# Patient Record
Sex: MALE | Race: OTHER | Employment: UNEMPLOYED | URBAN - METROPOLITAN AREA
[De-identification: names, ages, dates, MRNs, and addresses within clinical notes are randomized per-mention and may not be internally consistent; named-entity substitution may affect disease eponyms.]

---

## 2018-04-12 RX ORDER — LISINOPRIL 10 MG/1
10 TABLET ORAL DAILY
Qty: 90 TABLET | Refills: 0 | Status: SHIPPED | OUTPATIENT
Start: 2018-04-12 | End: 2018-07-17 | Stop reason: SDUPTHER

## 2018-05-16 ENCOUNTER — TELEPHONE (OUTPATIENT)
Dept: ADMINISTRATIVE | Age: 33
End: 2018-05-16

## 2018-05-29 ENCOUNTER — HOSPITAL ENCOUNTER (OUTPATIENT)
Age: 33
Discharge: HOME | End: 2018-05-31
Payer: MEDICAID

## 2018-05-29 ENCOUNTER — OFFICE VISIT (OUTPATIENT)
Dept: FAMILY MEDICINE CLINIC | Age: 33
End: 2018-05-29
Payer: MEDICAID

## 2018-05-29 VITALS
RESPIRATION RATE: 16 BRPM | WEIGHT: 203 LBS | TEMPERATURE: 98.5 F | DIASTOLIC BLOOD PRESSURE: 84 MMHG | BODY MASS INDEX: 27.5 KG/M2 | OXYGEN SATURATION: 97 % | HEIGHT: 72 IN | SYSTOLIC BLOOD PRESSURE: 126 MMHG | HEART RATE: 89 BPM

## 2018-05-29 DIAGNOSIS — Z00.00 PREVENTATIVE HEALTH CARE: Primary | ICD-10-CM

## 2018-05-29 DIAGNOSIS — R74.8 ELEVATED LIVER ENZYMES: ICD-10-CM

## 2018-05-29 DIAGNOSIS — Z00.00 PREVENTATIVE HEALTH CARE: ICD-10-CM

## 2018-05-29 LAB
ALBUMIN SERPL-MCNC: 4.5 G/DL (ref 3.5–5.2)
ALP BLD-CCNC: 67 U/L (ref 40–129)
ALT SERPL-CCNC: 37 U/L (ref 0–40)
ANION GAP SERPL CALCULATED.3IONS-SCNC: 19 MMOL/L (ref 7–16)
AST SERPL-CCNC: 35 U/L (ref 0–39)
BASOPHILS ABSOLUTE: 0.05 E9/L (ref 0–0.2)
BASOPHILS RELATIVE PERCENT: 1 % (ref 0–2)
BILIRUB SERPL-MCNC: 0.2 MG/DL (ref 0–1.2)
BUN BLDV-MCNC: 18 MG/DL (ref 6–20)
CALCIUM SERPL-MCNC: 9.2 MG/DL (ref 8.6–10.2)
CHLORIDE BLD-SCNC: 99 MMOL/L (ref 98–107)
CHOLESTEROL, TOTAL: 301 MG/DL (ref 0–199)
CO2: 20 MMOL/L (ref 22–29)
CREAT SERPL-MCNC: 0.9 MG/DL (ref 0.7–1.2)
EOSINOPHILS ABSOLUTE: 0.08 E9/L (ref 0.05–0.5)
EOSINOPHILS RELATIVE PERCENT: 1.5 % (ref 0–6)
GFR AFRICAN AMERICAN: >60
GFR NON-AFRICAN AMERICAN: >60 ML/MIN/1.73
GLUCOSE BLD-MCNC: 86 MG/DL (ref 74–109)
HCT VFR BLD CALC: 46.5 % (ref 37–54)
HDLC SERPL-MCNC: 56 MG/DL
HEMOGLOBIN: 15.6 G/DL (ref 12.5–16.5)
IMMATURE GRANULOCYTES #: 0.02 E9/L
IMMATURE GRANULOCYTES %: 0.4 % (ref 0–5)
LDL CHOLESTEROL CALCULATED: 187 MG/DL (ref 0–99)
LYMPHOCYTES ABSOLUTE: 1.16 E9/L (ref 1.5–4)
LYMPHOCYTES RELATIVE PERCENT: 22.4 % (ref 20–42)
MCH RBC QN AUTO: 33.2 PG (ref 26–35)
MCHC RBC AUTO-ENTMCNC: 33.5 % (ref 32–34.5)
MCV RBC AUTO: 98.9 FL (ref 80–99.9)
MONOCYTES ABSOLUTE: 0.48 E9/L (ref 0.1–0.95)
MONOCYTES RELATIVE PERCENT: 9.3 % (ref 2–12)
NEUTROPHILS ABSOLUTE: 3.38 E9/L (ref 1.8–7.3)
NEUTROPHILS RELATIVE PERCENT: 65.4 % (ref 43–80)
PDW BLD-RTO: 12.2 FL (ref 11.5–15)
PLATELET # BLD: 233 E9/L (ref 130–450)
PMV BLD AUTO: 11.7 FL (ref 7–12)
POTASSIUM SERPL-SCNC: 4.9 MMOL/L (ref 3.5–5)
RBC # BLD: 4.7 E12/L (ref 3.8–5.8)
SODIUM BLD-SCNC: 138 MMOL/L (ref 132–146)
TOTAL PROTEIN: 7.1 G/DL (ref 6.4–8.3)
TRIGL SERPL-MCNC: 291 MG/DL (ref 0–149)
VLDLC SERPL CALC-MCNC: 58 MG/DL
WBC # BLD: 5.2 E9/L (ref 4.5–11.5)

## 2018-05-29 PROCEDURE — 4004F PT TOBACCO SCREEN RCVD TLK: CPT | Performed by: FAMILY MEDICINE

## 2018-05-29 PROCEDURE — 80061 LIPID PANEL: CPT

## 2018-05-29 PROCEDURE — 85025 COMPLETE CBC W/AUTO DIFF WBC: CPT

## 2018-05-29 PROCEDURE — G8427 DOCREV CUR MEDS BY ELIG CLIN: HCPCS | Performed by: FAMILY MEDICINE

## 2018-05-29 PROCEDURE — 80053 COMPREHEN METABOLIC PANEL: CPT

## 2018-05-29 PROCEDURE — G8419 CALC BMI OUT NRM PARAM NOF/U: HCPCS | Performed by: FAMILY MEDICINE

## 2018-05-29 PROCEDURE — 99385 PREV VISIT NEW AGE 18-39: CPT | Performed by: FAMILY MEDICINE

## 2018-05-29 ASSESSMENT — PATIENT HEALTH QUESTIONNAIRE - PHQ9
2. FEELING DOWN, DEPRESSED OR HOPELESS: 0
SUM OF ALL RESPONSES TO PHQ QUESTIONS 1-9: 0
1. LITTLE INTEREST OR PLEASURE IN DOING THINGS: 0
SUM OF ALL RESPONSES TO PHQ9 QUESTIONS 1 & 2: 0

## 2018-06-17 ASSESSMENT — ENCOUNTER SYMPTOMS
WHEEZING: 0
CHEST TIGHTNESS: 0
COUGH: 0
VOMITING: 0
CONSTIPATION: 0
ABDOMINAL PAIN: 0
COLOR CHANGE: 0
EYE REDNESS: 0
SHORTNESS OF BREATH: 0
DIARRHEA: 0
BLOOD IN STOOL: 0

## 2018-06-22 ENCOUNTER — TELEPHONE (OUTPATIENT)
Dept: FAMILY MEDICINE CLINIC | Age: 33
End: 2018-06-22

## 2018-07-16 ENCOUNTER — TELEPHONE (OUTPATIENT)
Dept: FAMILY MEDICINE CLINIC | Age: 33
End: 2018-07-16

## 2018-07-16 RX ORDER — LISINOPRIL 10 MG/1
10 TABLET ORAL DAILY
Qty: 90 TABLET | Refills: 0 | Status: CANCELLED | OUTPATIENT
Start: 2018-07-16

## 2018-07-16 NOTE — TELEPHONE ENCOUNTER
Pt calling for a refill on Lisinopril. [facility] pharmacy in [facility].  Contact # [phone]

## 2018-07-18 RX ORDER — LISINOPRIL 10 MG/1
TABLET ORAL
Qty: 90 TABLET | Refills: 1 | Status: SHIPPED | OUTPATIENT
Start: 2018-07-18 | End: 2019-01-19 | Stop reason: SDUPTHER

## 2018-08-23 ENCOUNTER — TELEPHONE (OUTPATIENT)
Dept: ADMINISTRATIVE | Age: 33
End: 2018-08-23

## 2018-08-24 ENCOUNTER — HOSPITAL ENCOUNTER (OUTPATIENT)
Age: 33
Discharge: HOME | End: 2018-08-26
Payer: MEDICAID

## 2018-08-24 ENCOUNTER — OFFICE VISIT (OUTPATIENT)
Dept: FAMILY MEDICINE CLINIC | Age: 33
End: 2018-08-24
Payer: MEDICAID

## 2018-08-24 VITALS
SYSTOLIC BLOOD PRESSURE: 112 MMHG | TEMPERATURE: 97.9 F | WEIGHT: 192 LBS | HEART RATE: 79 BPM | BODY MASS INDEX: 26.88 KG/M2 | RESPIRATION RATE: 20 BRPM | HEIGHT: 71 IN | DIASTOLIC BLOOD PRESSURE: 76 MMHG | OXYGEN SATURATION: 98 %

## 2018-08-24 DIAGNOSIS — R55 NEAR SYNCOPE: ICD-10-CM

## 2018-08-24 DIAGNOSIS — Z13.220 SCREENING, LIPID: ICD-10-CM

## 2018-08-24 DIAGNOSIS — R55 NEAR SYNCOPE: Primary | ICD-10-CM

## 2018-08-24 LAB
ANION GAP SERPL CALCULATED.3IONS-SCNC: 14 MMOL/L (ref 7–16)
BUN BLDV-MCNC: 16 MG/DL (ref 6–20)
CALCIUM SERPL-MCNC: 9.2 MG/DL (ref 8.6–10.2)
CHLORIDE BLD-SCNC: 101 MMOL/L (ref 98–107)
CHOLESTEROL, TOTAL: 264 MG/DL (ref 0–199)
CO2: 23 MMOL/L (ref 22–29)
CREAT SERPL-MCNC: 0.9 MG/DL (ref 0.7–1.2)
GFR AFRICAN AMERICAN: >60
GFR NON-AFRICAN AMERICAN: >60 ML/MIN/1.73
GLUCOSE BLD-MCNC: 84 MG/DL (ref 74–109)
HDLC SERPL-MCNC: 74 MG/DL
LDL CHOLESTEROL CALCULATED: 150 MG/DL (ref 0–99)
POTASSIUM SERPL-SCNC: 5.1 MMOL/L (ref 3.5–5)
SODIUM BLD-SCNC: 138 MMOL/L (ref 132–146)
TRIGL SERPL-MCNC: 202 MG/DL (ref 0–149)
VLDLC SERPL CALC-MCNC: 40 MG/DL

## 2018-08-24 PROCEDURE — G8427 DOCREV CUR MEDS BY ELIG CLIN: HCPCS | Performed by: FAMILY MEDICINE

## 2018-08-24 PROCEDURE — 99213 OFFICE O/P EST LOW 20 MIN: CPT | Performed by: FAMILY MEDICINE

## 2018-08-24 PROCEDURE — 80061 LIPID PANEL: CPT

## 2018-08-24 PROCEDURE — G8419 CALC BMI OUT NRM PARAM NOF/U: HCPCS | Performed by: FAMILY MEDICINE

## 2018-08-24 PROCEDURE — 80048 BASIC METABOLIC PNL TOTAL CA: CPT

## 2018-08-24 PROCEDURE — 4004F PT TOBACCO SCREEN RCVD TLK: CPT | Performed by: FAMILY MEDICINE

## 2018-08-24 NOTE — PROGRESS NOTES
Subjective:     Patient: Kirill Mcgowan is a 28 y.o. male    HPI Not fully passed out. Hands and feet numb, some darkness. 14 hour days. Lifting 25-75 pounds every day. Hot weather. Previously landscarping. Drinks a lot of water, felt like heat exhaustion. 2 gallons per day. Does do acc walk at work. Taking 2 alleve per day    Review of Systems     No Known Allergies  Current Outpatient Prescriptions on File Prior to Visit   Medication Sig Dispense Refill    lisinopril (PRINIVIL;ZESTRIL) 10 MG tablet take 1 tablet by mouth once daily 90 tablet 1     No current facility-administered medications on file prior to visit. Past Medical History:   Diagnosis Date    Boxer's metacarpal fracture, neck, closed 2009      Patient Active Problem List   Diagnosis    Proximal phalanx fracture of finger    Laceration of finger of right hand    Elevated liver enzymes     Social History   Substance Use Topics    Smoking status: Current Every Day Smoker     Packs/day: 0.25     Types: Cigarettes    Smokeless tobacco: Never Used    Alcohol use Yes      Comment: vodka daily, frequent weekend binge drinking with 8 shots daily          Objective:     /76   Pulse 79   Temp 97.9 °F (36.6 °C) (Oral)   Resp 20   Ht 5' 11\" (1.803 m)   Wt 192 lb (87.1 kg)   SpO2 98%   BMI 26.78 kg/m²     Physical Exam   Constitutional: He is oriented to person, place, and time. He appears well-developed and well-nourished. Cardiovascular: Normal rate, regular rhythm, normal heart sounds and intact distal pulses. Exam reveals no gallop. No murmur heard. DP pulse 2/4 without edema   Pulmonary/Chest: Effort normal and breath sounds normal. No respiratory distress. He has no wheezes. Neurological: He is alert and oriented to person, place, and time. He has normal reflexes. He displays normal reflexes. No cranial nerve deficit. He exhibits normal muscle tone.  Coordination normal.   MS 5/5 Bilateral upper and lower

## 2018-10-01 ENCOUNTER — OFFICE VISIT (OUTPATIENT)
Dept: FAMILY MEDICINE CLINIC | Age: 33
End: 2018-10-01
Payer: MEDICAID

## 2018-10-01 VITALS
DIASTOLIC BLOOD PRESSURE: 82 MMHG | HEIGHT: 71 IN | HEART RATE: 87 BPM | SYSTOLIC BLOOD PRESSURE: 138 MMHG | OXYGEN SATURATION: 97 % | WEIGHT: 190 LBS | TEMPERATURE: 97.8 F | RESPIRATION RATE: 14 BRPM | BODY MASS INDEX: 26.6 KG/M2

## 2018-10-01 DIAGNOSIS — K52.9 ACUTE GASTROENTERITIS: Primary | ICD-10-CM

## 2018-10-01 PROCEDURE — G8484 FLU IMMUNIZE NO ADMIN: HCPCS | Performed by: FAMILY MEDICINE

## 2018-10-01 PROCEDURE — 99213 OFFICE O/P EST LOW 20 MIN: CPT | Performed by: FAMILY MEDICINE

## 2018-10-01 PROCEDURE — 4004F PT TOBACCO SCREEN RCVD TLK: CPT | Performed by: FAMILY MEDICINE

## 2018-10-01 PROCEDURE — G8419 CALC BMI OUT NRM PARAM NOF/U: HCPCS | Performed by: FAMILY MEDICINE

## 2018-10-01 PROCEDURE — G8427 DOCREV CUR MEDS BY ELIG CLIN: HCPCS | Performed by: FAMILY MEDICINE

## 2018-10-01 RX ORDER — LOPERAMIDE HYDROCHLORIDE 2 MG/1
2 CAPSULE ORAL 4 TIMES DAILY PRN
Qty: 15 CAPSULE | Refills: 0 | Status: SHIPPED
Start: 2018-10-01 | End: 2020-02-11

## 2018-10-18 ASSESSMENT — ENCOUNTER SYMPTOMS
DIARRHEA: 1
ABDOMINAL PAIN: 0
BLOOD IN STOOL: 0
WHEEZING: 0
CONSTIPATION: 0
COUGH: 0
COLOR CHANGE: 0
EYE REDNESS: 0
SHORTNESS OF BREATH: 0
VOMITING: 0
NAUSEA: 0
CHEST TIGHTNESS: 0

## 2018-11-13 ENCOUNTER — OFFICE VISIT (OUTPATIENT)
Dept: FAMILY MEDICINE CLINIC | Age: 33
End: 2018-11-13
Payer: MEDICAID

## 2018-11-13 VITALS
SYSTOLIC BLOOD PRESSURE: 126 MMHG | RESPIRATION RATE: 16 BRPM | WEIGHT: 178 LBS | BODY MASS INDEX: 23.59 KG/M2 | OXYGEN SATURATION: 98 % | HEIGHT: 73 IN | DIASTOLIC BLOOD PRESSURE: 82 MMHG | HEART RATE: 82 BPM | TEMPERATURE: 98.1 F

## 2018-11-13 DIAGNOSIS — R50.9 FEVER, UNSPECIFIED FEVER CAUSE: ICD-10-CM

## 2018-11-13 DIAGNOSIS — R52 BODY ACHES: Primary | ICD-10-CM

## 2018-11-13 DIAGNOSIS — R11.2 NON-INTRACTABLE VOMITING WITH NAUSEA, UNSPECIFIED VOMITING TYPE: ICD-10-CM

## 2018-11-13 LAB
INFLUENZA A ANTIGEN, POC: NORMAL
INFLUENZA B ANTIGEN, POC: NORMAL

## 2018-11-13 PROCEDURE — G8420 CALC BMI NORM PARAMETERS: HCPCS | Performed by: FAMILY MEDICINE

## 2018-11-13 PROCEDURE — 4004F PT TOBACCO SCREEN RCVD TLK: CPT | Performed by: FAMILY MEDICINE

## 2018-11-13 PROCEDURE — G8427 DOCREV CUR MEDS BY ELIG CLIN: HCPCS | Performed by: FAMILY MEDICINE

## 2018-11-13 PROCEDURE — G8484 FLU IMMUNIZE NO ADMIN: HCPCS | Performed by: FAMILY MEDICINE

## 2018-11-13 PROCEDURE — 87804 INFLUENZA ASSAY W/OPTIC: CPT | Performed by: FAMILY MEDICINE

## 2018-11-13 PROCEDURE — 99213 OFFICE O/P EST LOW 20 MIN: CPT | Performed by: FAMILY MEDICINE

## 2018-11-13 RX ORDER — ONDANSETRON HYDROCHLORIDE 8 MG/1
8 TABLET, FILM COATED ORAL EVERY 8 HOURS PRN
Qty: 9 TABLET | Refills: 0 | Status: SHIPPED
Start: 2018-11-13 | End: 2020-02-11

## 2018-11-13 NOTE — PROGRESS NOTES
mood. The patient is not nervous/anxious. All other systems reviewed and are negative. PHYSICAL EXAM  /82   Pulse 82   Temp 98.1 °F (36.7 °C)   Resp 16   Ht 6' 1\" (1.854 m)   Wt 178 lb (80.7 kg)   SpO2 98%   BMI 23.48 kg/m²   Physical Exam   Constitutional: He appears well-developed and well-nourished. No distress. HENT:   Head: Normocephalic and atraumatic. Right Ear: Tympanic membrane, external ear and ear canal normal.   Left Ear: Tympanic membrane, external ear and ear canal normal.   Nose: Mucosal edema and rhinorrhea present. Right sinus exhibits frontal sinus tenderness. Left sinus exhibits frontal sinus tenderness. Mouth/Throat: Uvula is midline and mucous membranes are normal. Posterior oropharyngeal erythema present. No oropharyngeal exudate or posterior oropharyngeal edema. Eyes: Pupils are equal, round, and reactive to light. Conjunctivae are normal. No scleral icterus. Neck: Neck supple. No thyromegaly present. Cardiovascular: Normal rate, regular rhythm and normal heart sounds. Pulmonary/Chest: Effort normal and breath sounds normal. He has no wheezes. He has no rales. Abdominal: Soft. Bowel sounds are normal.   Lymphadenopathy:     He has no cervical adenopathy. Skin: Skin is warm and dry. Flu neg    ASSESSMENT/PLAN:     Diagnosis Orders   1. Body aches  POCT Influenza A/B Antigen (BD Veritor)   2. Fever, unspecified fever cause  POCT Influenza A/B Antigen (BD Veritor)   3. Non-intractable vomiting with nausea, unspecified vomiting type  ondansetron (ZOFRAN) 8 MG tablet     Zofran. Stools improving. Will call tomorrow with update on symptoms in order to return to work. Reviewed age and gender appropriate health screening exams and vaccinations.   Advisedpatient regarding importance of keeping up with recommended health maintenance and to schedule as soon as possible if overdue, as this is important in assessing for undiagnosed

## 2018-11-26 ASSESSMENT — ENCOUNTER SYMPTOMS
ABDOMINAL PAIN: 0
EYE REDNESS: 0
BLOOD IN STOOL: 0
WHEEZING: 0
SINUS PAIN: 1
VOMITING: 1
CONSTIPATION: 0
NAUSEA: 1
CHEST TIGHTNESS: 0
SINUS PRESSURE: 1
SHORTNESS OF BREATH: 0
DIARRHEA: 1
COUGH: 0
COLOR CHANGE: 0

## 2019-01-10 ENCOUNTER — TELEPHONE (OUTPATIENT)
Dept: FAMILY MEDICINE CLINIC | Age: 34
End: 2019-01-10

## 2019-01-22 RX ORDER — LISINOPRIL 10 MG/1
TABLET ORAL
Qty: 90 TABLET | Refills: 1 | OUTPATIENT
Start: 2019-01-22

## 2019-01-22 RX ORDER — LISINOPRIL 10 MG/1
TABLET ORAL
Qty: 90 TABLET | Refills: 1 | Status: SHIPPED | OUTPATIENT
Start: 2019-01-22 | End: 2019-01-22 | Stop reason: SDUPTHER

## 2019-01-22 RX ORDER — LISINOPRIL 10 MG/1
TABLET ORAL
Qty: 90 TABLET | Refills: 1 | Status: SHIPPED | OUTPATIENT
Start: 2019-01-22 | End: 2019-09-17 | Stop reason: SDUPTHER

## 2019-01-23 ENCOUNTER — TELEPHONE (OUTPATIENT)
Dept: FAMILY MEDICINE CLINIC | Age: 34
End: 2019-01-23

## 2019-02-13 ENCOUNTER — OFFICE VISIT (OUTPATIENT)
Dept: FAMILY MEDICINE CLINIC | Age: 34
End: 2019-02-13
Payer: MEDICAID

## 2019-02-13 VITALS
SYSTOLIC BLOOD PRESSURE: 114 MMHG | RESPIRATION RATE: 14 BRPM | HEART RATE: 89 BPM | DIASTOLIC BLOOD PRESSURE: 74 MMHG | BODY MASS INDEX: 25.06 KG/M2 | WEIGHT: 179 LBS | TEMPERATURE: 98.4 F | OXYGEN SATURATION: 99 % | HEIGHT: 71 IN

## 2019-02-13 DIAGNOSIS — B34.9 VIRAL ILLNESS: Primary | ICD-10-CM

## 2019-02-13 DIAGNOSIS — G56.01 RIGHT CARPAL TUNNEL SYNDROME: ICD-10-CM

## 2019-02-13 PROCEDURE — G8484 FLU IMMUNIZE NO ADMIN: HCPCS | Performed by: FAMILY MEDICINE

## 2019-02-13 PROCEDURE — G8420 CALC BMI NORM PARAMETERS: HCPCS | Performed by: FAMILY MEDICINE

## 2019-02-13 PROCEDURE — 99213 OFFICE O/P EST LOW 20 MIN: CPT | Performed by: FAMILY MEDICINE

## 2019-02-13 PROCEDURE — G8427 DOCREV CUR MEDS BY ELIG CLIN: HCPCS | Performed by: FAMILY MEDICINE

## 2019-02-13 PROCEDURE — 4004F PT TOBACCO SCREEN RCVD TLK: CPT | Performed by: FAMILY MEDICINE

## 2019-02-13 ASSESSMENT — PATIENT HEALTH QUESTIONNAIRE - PHQ9
SUM OF ALL RESPONSES TO PHQ9 QUESTIONS 1 & 2: 0
SUM OF ALL RESPONSES TO PHQ QUESTIONS 1-9: 0
SUM OF ALL RESPONSES TO PHQ QUESTIONS 1-9: 0
2. FEELING DOWN, DEPRESSED OR HOPELESS: 0
1. LITTLE INTEREST OR PLEASURE IN DOING THINGS: 0

## 2019-03-03 ASSESSMENT — ENCOUNTER SYMPTOMS
WHEEZING: 0
CHEST TIGHTNESS: 0
SINUS PAIN: 0
VOMITING: 0
COUGH: 0
EYE DISCHARGE: 0
CONSTIPATION: 0
DIARRHEA: 0
EYE PAIN: 0
EYE ITCHING: 0
BLOOD IN STOOL: 0
RHINORRHEA: 0
ABDOMINAL PAIN: 0
EYE REDNESS: 0
COLOR CHANGE: 0
SORE THROAT: 0
SHORTNESS OF BREATH: 0
SINUS PRESSURE: 0
NAUSEA: 0

## 2019-03-26 ENCOUNTER — TELEPHONE (OUTPATIENT)
Dept: FAMILY MEDICINE CLINIC | Age: 34
End: 2019-03-26

## 2019-03-28 ENCOUNTER — OFFICE VISIT (OUTPATIENT)
Dept: FAMILY MEDICINE CLINIC | Age: 34
End: 2019-03-28
Payer: MEDICAID

## 2019-03-28 VITALS
BODY MASS INDEX: 24.83 KG/M2 | DIASTOLIC BLOOD PRESSURE: 68 MMHG | SYSTOLIC BLOOD PRESSURE: 110 MMHG | HEART RATE: 87 BPM | RESPIRATION RATE: 14 BRPM | WEIGHT: 178 LBS | OXYGEN SATURATION: 95 %

## 2019-03-28 DIAGNOSIS — H00.036 EYELID CELLULITIS, LEFT: ICD-10-CM

## 2019-03-28 DIAGNOSIS — H10.33 ACUTE CONJUNCTIVITIS OF BOTH EYES, UNSPECIFIED ACUTE CONJUNCTIVITIS TYPE: Primary | ICD-10-CM

## 2019-03-28 PROCEDURE — G8420 CALC BMI NORM PARAMETERS: HCPCS | Performed by: FAMILY MEDICINE

## 2019-03-28 PROCEDURE — 99213 OFFICE O/P EST LOW 20 MIN: CPT | Performed by: FAMILY MEDICINE

## 2019-03-28 PROCEDURE — G8484 FLU IMMUNIZE NO ADMIN: HCPCS | Performed by: FAMILY MEDICINE

## 2019-03-28 PROCEDURE — 4004F PT TOBACCO SCREEN RCVD TLK: CPT | Performed by: FAMILY MEDICINE

## 2019-03-28 PROCEDURE — G8427 DOCREV CUR MEDS BY ELIG CLIN: HCPCS | Performed by: FAMILY MEDICINE

## 2019-03-28 RX ORDER — CLINDAMYCIN HYDROCHLORIDE 300 MG/1
300 CAPSULE ORAL 3 TIMES DAILY
Qty: 21 CAPSULE | Refills: 0 | Status: SHIPPED | OUTPATIENT
Start: 2019-03-28 | End: 2019-04-04

## 2019-03-28 RX ORDER — POLYMYXIN B SULFATE AND TRIMETHOPRIM 1; 10000 MG/ML; [USP'U]/ML
1 SOLUTION OPHTHALMIC EVERY 4 HOURS
Qty: 20 ML | Refills: 0 | Status: SHIPPED | OUTPATIENT
Start: 2019-03-28 | End: 2019-04-04

## 2019-03-28 NOTE — PROGRESS NOTES
Psychiatric/Behavioral: Negative for confusion and dysphoric mood. The patient is not nervous/anxious. All other systems reviewed and are negative. PHYSICAL EXAM  /68   Pulse 87   Resp 14   Wt 178 lb (80.7 kg)   SpO2 95%   BMI 24.83 kg/m²   Physical Exam   Constitutional: He appears well-developed and well-nourished. No distress. HENT:   Head: Normocephalic and atraumatic. Right Ear: External ear normal.   Left Ear: External ear normal.   Nose: Nose normal.   Mouth/Throat: Oropharynx is clear and moist. No oropharyngeal exudate. Eyes: Pupils are equal, round, and reactive to light. Right eye exhibits no chemosis, no discharge, no exudate and no hordeolum. No foreign body present in the right eye. Left eye exhibits discharge. Left eye exhibits no chemosis, no exudate and no hordeolum. No foreign body present in the left eye. Right conjunctiva is injected. Right conjunctiva has no hemorrhage. Left conjunctiva is injected. Left conjunctiva has no hemorrhage. Right eye exhibits normal extraocular motion. Left eye exhibits normal extraocular motion. Cardiovascular: Normal rate and regular rhythm. Vitals reviewed. ASSESSMENT/PLAN:     Diagnosis Orders   1. Acute conjunctivitis of both eyes, unspecified acute conjunctivitis type  trimethoprim-polymyxin b (POLYTRIM) 25204-1.1 UNIT/ML-% ophthalmic solution   2. Eyelid cellulitis, left  clindamycin (CLEOCIN) 300 MG capsule       Reviewed age and gender appropriate health screening exams and vaccinations. Advisedpatient regarding importance of keeping up with recommended health maintenance and to schedule as soon as possible if overdue, as this is important in assessing for undiagnosed pathology, especially cancer. Patientverbalizes understanding and agrees. Call or go to ED immediately if symptoms worsen or persist.  No follow-ups on file. Sooner if necessary.       Counseled regarding above diagnosis, including possible risks and complications,especially if left uncontrolled. Counseled regarding the possible side effects, risks, benefits and alternatives to treatment; patient and/or guardian verbalizes understanding. Advised patient to call with any newmedication issues. All questions answered.     Rebecca Anaya MD  3/28/19

## 2019-04-15 ASSESSMENT — ENCOUNTER SYMPTOMS
BLOOD IN STOOL: 0
COUGH: 0
COLOR CHANGE: 0
SHORTNESS OF BREATH: 0
ABDOMINAL PAIN: 0
DIARRHEA: 0
VOMITING: 0
EYE PAIN: 0
EYE DISCHARGE: 0
EYE ITCHING: 0
CONSTIPATION: 0
WHEEZING: 0
EYE REDNESS: 1
PHOTOPHOBIA: 0
CHEST TIGHTNESS: 0

## 2019-09-20 RX ORDER — LISINOPRIL 10 MG/1
TABLET ORAL
Qty: 90 TABLET | Refills: 1 | Status: SHIPPED
Start: 2019-09-20 | End: 2020-03-20

## 2019-12-16 ENCOUNTER — OFFICE VISIT (OUTPATIENT)
Dept: PRIMARY CARE CLINIC | Age: 34
End: 2019-12-16
Payer: MEDICAID

## 2019-12-16 VITALS
BODY MASS INDEX: 26.11 KG/M2 | SYSTOLIC BLOOD PRESSURE: 137 MMHG | RESPIRATION RATE: 16 BRPM | HEIGHT: 73 IN | HEART RATE: 113 BPM | DIASTOLIC BLOOD PRESSURE: 84 MMHG | WEIGHT: 197 LBS | OXYGEN SATURATION: 96 %

## 2019-12-16 PROCEDURE — 90471 IMMUNIZATION ADMIN: CPT | Performed by: FAMILY MEDICINE

## 2019-12-16 PROCEDURE — G8482 FLU IMMUNIZE ORDER/ADMIN: HCPCS | Performed by: FAMILY MEDICINE

## 2019-12-16 PROCEDURE — 99395 PREV VISIT EST AGE 18-39: CPT | Performed by: FAMILY MEDICINE

## 2019-12-16 PROCEDURE — 90686 IIV4 VACC NO PRSV 0.5 ML IM: CPT | Performed by: FAMILY MEDICINE

## 2020-01-11 ASSESSMENT — ENCOUNTER SYMPTOMS
BLOOD IN STOOL: 0
COUGH: 0
ABDOMINAL PAIN: 0
DIARRHEA: 0
COLOR CHANGE: 0
CHEST TIGHTNESS: 0
EYE REDNESS: 0
CONSTIPATION: 0
WHEEZING: 0
SHORTNESS OF BREATH: 0
VOMITING: 0

## 2020-01-11 NOTE — PROGRESS NOTES
Nanda Romerochuck  : 1985    Chief Complaint:     Chief Complaint   Patient presents with    Hypertension    Flu Vaccine       HPI  Mood good. No excessive anxiety or stress. Eating healthy diet and trying to exercise, but not very consistent with it. BMI 26  Social History     Tobacco Use    Smoking status: Current Every Day Smoker     Packs/day: 0.25     Types: Cigarettes    Smokeless tobacco: Never Used   Substance Use Topics    Alcohol use: Yes     Comment: vodka daily, frequent weekend binge drinking with 8 shots daily    Drug use: No     Current on dental exam  No high risk sexual behavior  Wears seat belt. No guns in home. No environmental or occupational risks. Health Maintenance Due   Topic Date Due    Varicella Vaccine (1 of 2 - 2-dose childhood series) 02/15/1985    Pneumococcal 0-64 years Vaccine (1 of 1 - PPSV23) 02/15/1990    DTaP/Tdap/Td vaccine (1 - Tdap) 02/15/1995    HIV screen  02/15/1999    Potassium monitoring  2019    Creatinine monitoring  2019     Past medical, surgical, family and social histories reviewed and updated today as appropriate    Health Maintenance Due   Topic Date Due    Varicella Vaccine (1 of 2 - 2-dose childhood series) 02/15/1985    Pneumococcal 0-64 years Vaccine (1 of 1 - PPSV23) 02/15/1990    DTaP/Tdap/Td vaccine (1 - Tdap) 02/15/1995    HIV screen  02/15/1999    Potassium monitoring  2019    Creatinine monitoring  2019       Current Outpatient Medications   Medication Sig Dispense Refill    lisinopril (PRINIVIL;ZESTRIL) 10 MG tablet take 1 tablet by mouth once daily 90 tablet 1    Misc.  Devices (WRIST BRACE) MISC Soft neutral position right wrist brace  Size to fit  Dx:  Wrist pain/carpal tunnel syndrome 1 each 0    ondansetron (ZOFRAN) 8 MG tablet Take 1 tablet by mouth every 8 hours as needed for Nausea or Vomiting 9 tablet 0    loperamide (RA ANTI-DIARRHEAL) 2 MG capsule Take 1 capsule by mouth 4 times

## 2020-02-11 ENCOUNTER — OFFICE VISIT (OUTPATIENT)
Dept: PRIMARY CARE CLINIC | Age: 35
End: 2020-02-11
Payer: MEDICAID

## 2020-02-11 VITALS
OXYGEN SATURATION: 99 % | DIASTOLIC BLOOD PRESSURE: 121 MMHG | HEIGHT: 72 IN | SYSTOLIC BLOOD PRESSURE: 172 MMHG | BODY MASS INDEX: 26.55 KG/M2 | HEART RATE: 73 BPM | RESPIRATION RATE: 14 BRPM | WEIGHT: 196 LBS

## 2020-02-11 PROCEDURE — 99214 OFFICE O/P EST MOD 30 MIN: CPT | Performed by: NURSE PRACTITIONER

## 2020-02-11 PROCEDURE — 4004F PT TOBACCO SCREEN RCVD TLK: CPT | Performed by: NURSE PRACTITIONER

## 2020-02-11 PROCEDURE — G8419 CALC BMI OUT NRM PARAM NOF/U: HCPCS | Performed by: NURSE PRACTITIONER

## 2020-02-11 PROCEDURE — G8482 FLU IMMUNIZE ORDER/ADMIN: HCPCS | Performed by: NURSE PRACTITIONER

## 2020-02-11 PROCEDURE — G8427 DOCREV CUR MEDS BY ELIG CLIN: HCPCS | Performed by: NURSE PRACTITIONER

## 2020-02-11 RX ORDER — CLOTRIMAZOLE AND BETAMETHASONE DIPROPIONATE 10; .64 MG/G; MG/G
CREAM TOPICAL
Qty: 15 G | Refills: 0 | Status: SHIPPED
Start: 2020-02-11 | End: 2021-04-05

## 2020-02-11 ASSESSMENT — PATIENT HEALTH QUESTIONNAIRE - PHQ9
SUM OF ALL RESPONSES TO PHQ QUESTIONS 1-9: 0
SUM OF ALL RESPONSES TO PHQ9 QUESTIONS 1 & 2: 0
SUM OF ALL RESPONSES TO PHQ QUESTIONS 1-9: 0
2. FEELING DOWN, DEPRESSED OR HOPELESS: 0
1. LITTLE INTEREST OR PLEASURE IN DOING THINGS: 0

## 2020-02-11 NOTE — PROGRESS NOTES
Chief Complaint:   Wrist Pain (right wrist pain, lifting box at work and now it hurts worse then before) and Rash (right leg rash for a couple months, itchy)      History of Present Illness   Source of history provided by:  patient. History/Exam Limitations: none. Solon Meckel is a 27 y.o. old male who has a past medical history of:   Past Medical History:   Diagnosis Date    Boxer's metacarpal fracture, neck, closed 2009    Presents to the [facility] care complaining of right wrist pain pain for the past 3 days. Pt states there was a traumatic incident in which he hurt his wrist doing [employer] with his friends. Patient tried ice, aleve, and a compression cast with some relief. Patient states at work he lifts 40-80lbs and his pain was exacerbated at work. Pt states there is no bruising but minor swelling and some limited ROM to the area. Denies any paresthesias, weakness in the extremity, abrasions, active bleeding, recent illness, or any other complaints today. Presents with a right leg rash for the last few months. Patient tried using Aquaphor and a non-steroidal cream with no relief, patient states there is intermittent itching/pain to area. Patient denies fever/chills. ROS    Unless otherwise stated in this report or unable to obtain because of the patient's clinical or mental status as evidenced by the medical record, this patients's positive and negative responses for Review of Systems, constitutional, psych, eyes, ENT, cardiovascular, respiratory, gastrointestinal, neurological, genitourinary, musculoskeletal, integument systems and systems related to the presenting problem are either stated in the preceding or were not pertinent or were negative for the symptoms and/or complaints related to the medical problem. Past Medical History:  has a past medical history of Boxer's metacarpal fracture, neck, closed. Past Surgical History:  has no past surgical history on file.   Social History: reports that he has been smoking cigarettes. He has been smoking about 0.25 packs per day. He has never used smokeless tobacco. He reports current alcohol use. He reports that he does not use drugs. Family History: family history includes Coronary Art Dis in his maternal uncle. Allergies: Patient has no known allergies. Physical Exam         VS:  BP (!) 172/121   Pulse 73   Resp 14   Ht 6' (1.829 m)   Wt 196 lb (88.9 kg)   SpO2 99%   BMI 26.58 kg/m²    Oxygen Saturation Interpretation: Normal.    Constitutional:  A&Ox3, development consistent with age, NAD. CV: Heart RRR, no murmurs, rubs, or gallops. Lungs: CTAB without W/R/R. Wrist: Tenderness:  mild            Swelling: mild             Deformity: No obvious deformity noted. ROM: Decreased ROM due to pain. Skin:  No erythema, rashes, or abrasions noted. Neurovascular: Motor deficit: /UE strength 5/5 bilaterally. No increased pain with supination or pronation of the hand. Sensory deficit:   Sensation intact proximally and distally to the injury site. Pulse deficit: 2+ and bounding. Capillary refill: Less then 2 sec throughout. Hand: Right              Tenderness: thumb pip joint tenderness              Swelling: mild . Deformity: No obvious deformity. No malrotation noted. ROM: Decreased ROM due to pain. Skin:  No abrasions, erythema, or rashes noted. Neurological:  Alert and oriented. Motor functions intact. Skin: Patient has hyperpigmented erythematous scaly rash to right shin - 14 cm x 5 cm. Abrasions noted from scratching. Imaging Results   Imaging: All Radiology results interpreted by Radiologist unless otherwise noted. Assessment / Plan     Impression(s):    1.  Injury of right hand, initial encounter  THumb spica splint given to patient  Naprosyn 500 mg po bid  Ice  Will call with xray results  Return in 2 weeks if no improvement - may consider ortho referral  - XR HAND RIGHT (MIN 3 VIEWS); Future    2. Dermatitis    - clotrimazole-betamethasone (LOTRISONE) 1-0.05 % cream; Apply topically 2 times daily. Dispense: 15 g; Refill: 0      Scripts written for as above , side effects discussed. RICE protocol advised. Pt advised to f/u with orthopedics ASAP for further management and care. ER sooner if symptoms persist, worsen, or change. Red flag symptoms discussed. All questions answered. Return if symptoms worsen or fail to improve.     Jenifer White, [address]

## 2020-03-20 ENCOUNTER — TELEPHONE (OUTPATIENT)
Dept: FAMILY MEDICINE CLINIC | Age: 35
End: 2020-03-20

## 2020-03-20 RX ORDER — LISINOPRIL 10 MG/1
TABLET ORAL
Qty: 90 TABLET | Refills: 1 | Status: SHIPPED
Start: 2020-03-20 | End: 2020-08-11

## 2020-11-09 ENCOUNTER — TELEPHONE (OUTPATIENT)
Dept: PRIMARY CARE CLINIC | Age: 35
End: 2020-11-09

## 2020-11-09 NOTE — TELEPHONE ENCOUNTER
Pt called into office states that he needs refill on his LISINOPHRIL BP medication. Pt states that he has been out of it for 4 days. Asked if it can be prescribed ASAP. 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 Informed pt that message will be forwarded to clinical staff. Pt asked for return call when medications has been sent to pharmacy.        .Electronically signed by Ro Engle on 11/9/20 at 10:16 AM EST

## 2020-11-10 RX ORDER — LISINOPRIL 10 MG/1
TABLET ORAL
Qty: 90 TABLET | Refills: 1 | Status: SHIPPED
Start: 2020-11-10 | End: 2021-04-05

## 2020-12-23 ENCOUNTER — OFFICE VISIT (OUTPATIENT)
Dept: PRIMARY CARE CLINIC | Age: 35
End: 2020-12-23
Payer: MEDICAID

## 2020-12-23 VITALS
BODY MASS INDEX: 24.38 KG/M2 | DIASTOLIC BLOOD PRESSURE: 82 MMHG | SYSTOLIC BLOOD PRESSURE: 126 MMHG | WEIGHT: 180 LBS | HEIGHT: 72 IN | TEMPERATURE: 98.5 F | HEART RATE: 94 BPM | OXYGEN SATURATION: 98 %

## 2020-12-23 PROCEDURE — 99213 OFFICE O/P EST LOW 20 MIN: CPT | Performed by: NURSE PRACTITIONER

## 2020-12-23 PROCEDURE — G8484 FLU IMMUNIZE NO ADMIN: HCPCS | Performed by: NURSE PRACTITIONER

## 2020-12-23 PROCEDURE — G8427 DOCREV CUR MEDS BY ELIG CLIN: HCPCS | Performed by: NURSE PRACTITIONER

## 2020-12-23 PROCEDURE — 4004F PT TOBACCO SCREEN RCVD TLK: CPT | Performed by: NURSE PRACTITIONER

## 2020-12-23 PROCEDURE — G8420 CALC BMI NORM PARAMETERS: HCPCS | Performed by: NURSE PRACTITIONER

## 2020-12-23 NOTE — PROGRESS NOTES
Chief Complaint   Patient presents with    Rash     Started 2 months ago. Itchy       HPI:  Patient presents today for a rash to bilateral elbows x 2-3 months. Reports that the rash is dry, scaly and itchy. He has not tried any otc treatment for this. Denies that he has had this in the past. Denies any aggravating or alleviating factors. Denies fever, chills or any other signs of systemic infection. Prior to Visit Medications    Medication Sig Taking? Authorizing Provider   lisinopril (PRINIVIL;ZESTRIL) 10 MG tablet take 1 tablet by mouth once daily Yes Chidi Reveles MD   clotrimazole-betamethasone (LOTRISONE) 1-0.05 % cream Apply topically 2 times daily. Yes JEREMIAH Munoz - VANDANA   Misc. Devices (WRIST BRACE) MISC Soft neutral position right wrist brace  Size to fit  Dx:  Wrist pain/carpal tunnel syndrome Yes Chidi Reveles MD         No Known Allergies      Review of Systems  Review of Systems   Constitutional: Negative for chills and fever. HENT: Negative for congestion and nosebleeds. Respiratory: Negative for cough, shortness of breath and wheezing. Cardiovascular: Negative for chest pain, palpitations and leg swelling. Gastrointestinal: Negative for constipation, diarrhea, nausea and vomiting. Genitourinary: Negative for dysuria and urgency. Musculoskeletal: Negative for neck pain. Skin: Positive for rash. See HPI   Neurological: Negative for headaches. VS:  /82   Pulse 94   Temp 98.5 °F (36.9 °C)   Ht 6' (1.829 m)   Wt 180 lb (81.6 kg)   SpO2 98%   BMI 24.41 kg/m²     Patient's medical, social, and family history reviewed      Physical Exam  Physical Exam  Constitutional:       Appearance: He is well-developed. HENT:      Head: Normocephalic. Eyes:      Pupils: Pupils are equal, round, and reactive to light. Neck:      Musculoskeletal: Normal range of motion and neck supple. Thyroid: No thyromegaly.    Cardiovascular:      Rate and Rhythm: Normal rate and regular rhythm. Pulmonary:      Effort: Pulmonary effort is normal.      Breath sounds: Normal breath sounds. Abdominal:      General: Bowel sounds are normal.      Palpations: Abdomen is soft. Musculoskeletal: Normal range of motion. Lymphadenopathy:      Cervical: No cervical adenopathy. Skin:     Findings: Rash present. Rash is crusting (bilateral elbows) and scaling. Neurological:      Mental Status: He is alert and oriented to person, place, and time. Assessment/Plan:    1. Psoriasis    - betamethasone dipropionate (DIPROLENE) 0.05 % cream; Apply topically 2 times daily. Dispense: 45 g; Refill: 0    Follow up in 4-6 parish with PCP if no improvement. Patient instructed to use cream for 7 days on and 7 days off. Patient verbalized understanding,     Return if symptoms worsen or fail to improve.         JEREMIAH Sweet - CNP

## 2020-12-24 RX ORDER — BETAMETHASONE DIPROPIONATE 0.5 MG/G
CREAM TOPICAL
Qty: 45 G | Refills: 0 | Status: SHIPPED
Start: 2020-12-24 | End: 2021-04-05

## 2020-12-26 ASSESSMENT — ENCOUNTER SYMPTOMS
WHEEZING: 0
DIARRHEA: 0
CONSTIPATION: 0
NAUSEA: 0
VOMITING: 0
SHORTNESS OF BREATH: 0
COUGH: 0
ROS SKIN COMMENTS: SEE HPI

## 2020-12-28 RX ORDER — CLOBETASOL PROPIONATE 0.5 MG/G
CREAM TOPICAL
Qty: 1 TUBE | Refills: 0 | Status: SHIPPED
Start: 2020-12-28 | End: 2021-04-05

## 2021-03-31 ENCOUNTER — IMMUNIZATION (OUTPATIENT)
Dept: PRIMARY CARE CLINIC | Age: 36
End: 2021-03-31
Payer: MEDICAID

## 2021-03-31 PROCEDURE — 91300 COVID-19, PFIZER VACCINE 30MCG/0.3ML DOSE: CPT | Performed by: NURSE PRACTITIONER

## 2021-03-31 PROCEDURE — 0002A COVID-19, PFIZER VACCINE 30MCG/0.3ML DOSE: CPT | Performed by: NURSE PRACTITIONER

## 2021-04-04 ENCOUNTER — HOSPITAL ENCOUNTER (INPATIENT)
Age: 36
LOS: 1 days | Discharge: LEFT BEFORE DISCHARGE | DRG: 770 | End: 2021-04-06
Attending: EMERGENCY MEDICINE | Admitting: EMERGENCY MEDICINE
Payer: MEDICAID

## 2021-04-04 ENCOUNTER — APPOINTMENT (OUTPATIENT)
Dept: CT IMAGING | Age: 36
DRG: 770 | End: 2021-04-04
Payer: MEDICAID

## 2021-04-04 ENCOUNTER — APPOINTMENT (OUTPATIENT)
Dept: GENERAL RADIOLOGY | Age: 36
DRG: 770 | End: 2021-04-04
Payer: MEDICAID

## 2021-04-04 DIAGNOSIS — F10.939 ALCOHOL WITHDRAWAL SYNDROME WITH COMPLICATION (HCC): ICD-10-CM

## 2021-04-04 DIAGNOSIS — R56.9 NEW ONSET SEIZURE (HCC): Primary | ICD-10-CM

## 2021-04-04 LAB
ACETAMINOPHEN LEVEL: <5 MCG/ML (ref 10–30)
ALBUMIN SERPL-MCNC: 4.5 G/DL (ref 3.5–5.2)
ALP BLD-CCNC: 93 U/L (ref 40–129)
ALT SERPL-CCNC: 123 U/L (ref 0–40)
AMPHETAMINE SCREEN, URINE: NOT DETECTED
ANION GAP SERPL CALCULATED.3IONS-SCNC: 16 MMOL/L (ref 7–16)
ANISOCYTOSIS: ABNORMAL
AST SERPL-CCNC: 193 U/L (ref 0–39)
ATYPICAL LYMPHOCYTE RELATIVE PERCENT: 1.7 % (ref 0–4)
BARBITURATE SCREEN URINE: NOT DETECTED
BASOPHILS ABSOLUTE: 0 E9/L (ref 0–0.2)
BASOPHILS RELATIVE PERCENT: 1.5 % (ref 0–2)
BENZODIAZEPINE SCREEN, URINE: NOT DETECTED
BILIRUB SERPL-MCNC: 1.4 MG/DL (ref 0–1.2)
BUN BLDV-MCNC: 9 MG/DL (ref 6–20)
CALCIUM SERPL-MCNC: 9.5 MG/DL (ref 8.6–10.2)
CANNABINOID SCREEN URINE: NOT DETECTED
CHLORIDE BLD-SCNC: 95 MMOL/L (ref 98–107)
CO2: 23 MMOL/L (ref 22–29)
COCAINE METABOLITE SCREEN URINE: NOT DETECTED
CREAT SERPL-MCNC: 0.8 MG/DL (ref 0.7–1.2)
EOSINOPHILS ABSOLUTE: 0.07 E9/L (ref 0.05–0.5)
EOSINOPHILS RELATIVE PERCENT: 1.7 % (ref 0–6)
ETHANOL: <10 MG/DL (ref 0–0.08)
FENTANYL SCREEN, URINE: NOT DETECTED
GFR AFRICAN AMERICAN: >60
GFR NON-AFRICAN AMERICAN: >60 ML/MIN/1.73
GLUCOSE BLD-MCNC: 179 MG/DL (ref 74–99)
HCT VFR BLD CALC: 43.3 % (ref 37–54)
HEMOGLOBIN: 14.6 G/DL (ref 12.5–16.5)
LACTIC ACID: 3.2 MMOL/L (ref 0.5–2.2)
LYMPHOCYTES ABSOLUTE: 0.4 E9/L (ref 1.5–4)
LYMPHOCYTES RELATIVE PERCENT: 7.8 % (ref 20–42)
Lab: NORMAL
MAGNESIUM: 1.9 MG/DL (ref 1.6–2.6)
MCH RBC QN AUTO: 34.3 PG (ref 26–35)
MCHC RBC AUTO-ENTMCNC: 33.7 % (ref 32–34.5)
MCV RBC AUTO: 101.6 FL (ref 80–99.9)
METAMYELOCYTES RELATIVE PERCENT: 1.7 % (ref 0–1)
METHADONE SCREEN, URINE: NOT DETECTED
MONOCYTES ABSOLUTE: 0.2 E9/L (ref 0.1–0.95)
MONOCYTES RELATIVE PERCENT: 5.2 % (ref 2–12)
NEUTROPHILS ABSOLUTE: 3.32 E9/L (ref 1.8–7.3)
NEUTROPHILS RELATIVE PERCENT: 81.7 % (ref 43–80)
OPIATE SCREEN URINE: NOT DETECTED
OXYCODONE URINE: NOT DETECTED
PDW BLD-RTO: 12.7 FL (ref 11.5–15)
PHENCYCLIDINE SCREEN URINE: NOT DETECTED
PLATELET # BLD: 158 E9/L (ref 130–450)
PMV BLD AUTO: 10.8 FL (ref 7–12)
POLYCHROMASIA: ABNORMAL
POTASSIUM SERPL-SCNC: 4.2 MMOL/L (ref 3.5–5)
RBC # BLD: 4.26 E12/L (ref 3.8–5.8)
SALICYLATE, SERUM: <0.3 MG/DL (ref 0–30)
SODIUM BLD-SCNC: 134 MMOL/L (ref 132–146)
TOTAL PROTEIN: 7.2 G/DL (ref 6.4–8.3)
TRICYCLIC ANTIDEPRESSANTS SCREEN SERUM: NEGATIVE NG/ML
TROPONIN: <0.01 NG/ML (ref 0–0.03)
WBC # BLD: 4 E9/L (ref 4.5–11.5)

## 2021-04-04 PROCEDURE — 82077 ASSAY SPEC XCP UR&BREATH IA: CPT

## 2021-04-04 PROCEDURE — 70450 CT HEAD/BRAIN W/O DYE: CPT

## 2021-04-04 PROCEDURE — 2580000003 HC RX 258: Performed by: NURSE PRACTITIONER

## 2021-04-04 PROCEDURE — 6360000002 HC RX W HCPCS: Performed by: NURSE PRACTITIONER

## 2021-04-04 PROCEDURE — 83605 ASSAY OF LACTIC ACID: CPT

## 2021-04-04 PROCEDURE — 80143 DRUG ASSAY ACETAMINOPHEN: CPT

## 2021-04-04 PROCEDURE — 99284 EMERGENCY DEPT VISIT MOD MDM: CPT

## 2021-04-04 PROCEDURE — 80053 COMPREHEN METABOLIC PANEL: CPT

## 2021-04-04 PROCEDURE — 93005 ELECTROCARDIOGRAM TRACING: CPT | Performed by: NURSE PRACTITIONER

## 2021-04-04 PROCEDURE — 80179 DRUG ASSAY SALICYLATE: CPT

## 2021-04-04 PROCEDURE — 83735 ASSAY OF MAGNESIUM: CPT

## 2021-04-04 PROCEDURE — 71045 X-RAY EXAM CHEST 1 VIEW: CPT

## 2021-04-04 PROCEDURE — 87635 SARS-COV-2 COVID-19 AMP PRB: CPT

## 2021-04-04 PROCEDURE — 72125 CT NECK SPINE W/O DYE: CPT

## 2021-04-04 PROCEDURE — 84484 ASSAY OF TROPONIN QUANT: CPT

## 2021-04-04 PROCEDURE — 72110 X-RAY EXAM L-2 SPINE 4/>VWS: CPT

## 2021-04-04 PROCEDURE — 80307 DRUG TEST PRSMV CHEM ANLYZR: CPT

## 2021-04-04 PROCEDURE — 85025 COMPLETE CBC W/AUTO DIFF WBC: CPT

## 2021-04-04 PROCEDURE — 72074 X-RAY EXAM THORAC SPINE4/>VW: CPT

## 2021-04-04 RX ORDER — LORAZEPAM 1 MG/1
1 TABLET ORAL
Status: DISCONTINUED | OUTPATIENT
Start: 2021-04-04 | End: 2021-04-06 | Stop reason: HOSPADM

## 2021-04-04 RX ORDER — LORAZEPAM 2 MG/ML
1 INJECTION INTRAMUSCULAR
Status: DISCONTINUED | OUTPATIENT
Start: 2021-04-04 | End: 2021-04-06 | Stop reason: HOSPADM

## 2021-04-04 RX ORDER — LORAZEPAM 1 MG/1
2 TABLET ORAL
Status: DISCONTINUED | OUTPATIENT
Start: 2021-04-04 | End: 2021-04-06 | Stop reason: HOSPADM

## 2021-04-04 RX ORDER — LEVETIRACETAM 5 MG/ML
500 INJECTION INTRAVASCULAR ONCE
Status: COMPLETED | OUTPATIENT
Start: 2021-04-04 | End: 2021-04-04

## 2021-04-04 RX ORDER — SODIUM CHLORIDE 0.9 % (FLUSH) 0.9 %
10 SYRINGE (ML) INJECTION PRN
Status: DISCONTINUED | OUTPATIENT
Start: 2021-04-04 | End: 2021-04-05 | Stop reason: SDUPTHER

## 2021-04-04 RX ORDER — THIAMINE HYDROCHLORIDE 100 MG/ML
100 INJECTION, SOLUTION INTRAMUSCULAR; INTRAVENOUS DAILY
Status: DISCONTINUED | OUTPATIENT
Start: 2021-04-05 | End: 2021-04-06 | Stop reason: HOSPADM

## 2021-04-04 RX ORDER — SODIUM CHLORIDE 0.9 % (FLUSH) 0.9 %
10 SYRINGE (ML) INJECTION EVERY 12 HOURS SCHEDULED
Status: DISCONTINUED | OUTPATIENT
Start: 2021-04-04 | End: 2021-04-05 | Stop reason: SDUPTHER

## 2021-04-04 RX ORDER — LORAZEPAM 2 MG/ML
3 INJECTION INTRAMUSCULAR
Status: DISCONTINUED | OUTPATIENT
Start: 2021-04-04 | End: 2021-04-06 | Stop reason: HOSPADM

## 2021-04-04 RX ORDER — SODIUM CHLORIDE 9 MG/ML
25 INJECTION, SOLUTION INTRAVENOUS PRN
Status: DISCONTINUED | OUTPATIENT
Start: 2021-04-04 | End: 2021-04-06 | Stop reason: HOSPADM

## 2021-04-04 RX ORDER — LORAZEPAM 2 MG/ML
2 INJECTION INTRAMUSCULAR
Status: DISCONTINUED | OUTPATIENT
Start: 2021-04-04 | End: 2021-04-06 | Stop reason: HOSPADM

## 2021-04-04 RX ORDER — LORAZEPAM 1 MG/1
4 TABLET ORAL
Status: DISCONTINUED | OUTPATIENT
Start: 2021-04-04 | End: 2021-04-06 | Stop reason: HOSPADM

## 2021-04-04 RX ORDER — LORAZEPAM 2 MG/ML
4 INJECTION INTRAMUSCULAR
Status: DISCONTINUED | OUTPATIENT
Start: 2021-04-04 | End: 2021-04-06 | Stop reason: HOSPADM

## 2021-04-04 RX ORDER — 0.9 % SODIUM CHLORIDE 0.9 %
1000 INTRAVENOUS SOLUTION INTRAVENOUS ONCE
Status: COMPLETED | OUTPATIENT
Start: 2021-04-04 | End: 2021-04-04

## 2021-04-04 RX ORDER — LORAZEPAM 1 MG/1
3 TABLET ORAL
Status: DISCONTINUED | OUTPATIENT
Start: 2021-04-04 | End: 2021-04-06 | Stop reason: HOSPADM

## 2021-04-04 RX ADMIN — SODIUM CHLORIDE 1000 ML: 9 INJECTION, SOLUTION INTRAVENOUS at 22:02

## 2021-04-04 RX ADMIN — LORAZEPAM 4 MG: 2 INJECTION INTRAMUSCULAR; INTRAVENOUS at 22:21

## 2021-04-04 RX ADMIN — LEVETIRACETAM 500 MG: 5 INJECTION INTRAVENOUS at 22:03

## 2021-04-05 PROBLEM — R56.9 NEW ONSET SEIZURE (HCC): Status: ACTIVE | Noted: 2021-04-05

## 2021-04-05 PROBLEM — R56.9 SEIZURE (HCC): Status: ACTIVE | Noted: 2021-04-05

## 2021-04-05 LAB
ALBUMIN SERPL-MCNC: 4.3 G/DL (ref 3.5–5.2)
ALP BLD-CCNC: 83 U/L (ref 40–129)
ALT SERPL-CCNC: 113 U/L (ref 0–40)
ANION GAP SERPL CALCULATED.3IONS-SCNC: 15 MMOL/L (ref 7–16)
AST SERPL-CCNC: 205 U/L (ref 0–39)
BILIRUB SERPL-MCNC: 1.1 MG/DL (ref 0–1.2)
BUN BLDV-MCNC: 7 MG/DL (ref 6–20)
CALCIUM SERPL-MCNC: 9.1 MG/DL (ref 8.6–10.2)
CHLORIDE BLD-SCNC: 100 MMOL/L (ref 98–107)
CO2: 21 MMOL/L (ref 22–29)
CREAT SERPL-MCNC: 0.8 MG/DL (ref 0.7–1.2)
EKG ATRIAL RATE: 97 BPM
EKG P AXIS: 71 DEGREES
EKG P-R INTERVAL: 130 MS
EKG Q-T INTERVAL: 350 MS
EKG QRS DURATION: 74 MS
EKG QTC CALCULATION (BAZETT): 444 MS
EKG R AXIS: 86 DEGREES
EKG T AXIS: 35 DEGREES
EKG VENTRICULAR RATE: 97 BPM
GFR AFRICAN AMERICAN: >60
GFR NON-AFRICAN AMERICAN: >60 ML/MIN/1.73
GLUCOSE BLD-MCNC: 174 MG/DL
GLUCOSE BLD-MCNC: 96 MG/DL (ref 74–99)
HCT VFR BLD CALC: 41.8 % (ref 37–54)
HEMOGLOBIN: 14.3 G/DL (ref 12.5–16.5)
LACTIC ACID: 1.4 MMOL/L (ref 0.5–2.2)
MCH RBC QN AUTO: 35.2 PG (ref 26–35)
MCHC RBC AUTO-ENTMCNC: 34.2 % (ref 32–34.5)
MCV RBC AUTO: 103 FL (ref 80–99.9)
PDW BLD-RTO: 12.7 FL (ref 11.5–15)
PLATELET # BLD: 151 E9/L (ref 130–450)
PMV BLD AUTO: 11.3 FL (ref 7–12)
POTASSIUM REFLEX MAGNESIUM: 3.9 MMOL/L (ref 3.5–5)
RBC # BLD: 4.06 E12/L (ref 3.8–5.8)
SARS-COV-2, NAAT: NOT DETECTED
SODIUM BLD-SCNC: 136 MMOL/L (ref 132–146)
TOTAL CK: 3495 U/L (ref 20–200)
TOTAL PROTEIN: 6.8 G/DL (ref 6.4–8.3)
WBC # BLD: 5.1 E9/L (ref 4.5–11.5)

## 2021-04-05 PROCEDURE — 2580000003 HC RX 258: Performed by: EMERGENCY MEDICINE

## 2021-04-05 PROCEDURE — 6360000002 HC RX W HCPCS: Performed by: INTERNAL MEDICINE

## 2021-04-05 PROCEDURE — 2060000000 HC ICU INTERMEDIATE R&B

## 2021-04-05 PROCEDURE — 6360000002 HC RX W HCPCS: Performed by: NURSE PRACTITIONER

## 2021-04-05 PROCEDURE — 6370000000 HC RX 637 (ALT 250 FOR IP): Performed by: EMERGENCY MEDICINE

## 2021-04-05 PROCEDURE — 99253 IP/OBS CNSLTJ NEW/EST LOW 45: CPT | Performed by: PSYCHIATRY & NEUROLOGY

## 2021-04-05 PROCEDURE — 85027 COMPLETE CBC AUTOMATED: CPT

## 2021-04-05 PROCEDURE — 80053 COMPREHEN METABOLIC PANEL: CPT

## 2021-04-05 PROCEDURE — 82550 ASSAY OF CK (CPK): CPT

## 2021-04-05 PROCEDURE — 6360000002 HC RX W HCPCS: Performed by: EMERGENCY MEDICINE

## 2021-04-05 PROCEDURE — 83605 ASSAY OF LACTIC ACID: CPT

## 2021-04-05 RX ORDER — ACETAMINOPHEN 325 MG/1
650 TABLET ORAL EVERY 6 HOURS PRN
Status: DISCONTINUED | OUTPATIENT
Start: 2021-04-05 | End: 2021-04-06 | Stop reason: HOSPADM

## 2021-04-05 RX ORDER — LISINOPRIL 10 MG/1
10 TABLET ORAL DAILY
COMMUNITY
End: 2021-06-18

## 2021-04-05 RX ORDER — SODIUM CHLORIDE 0.9 % (FLUSH) 0.9 %
10 SYRINGE (ML) INJECTION EVERY 12 HOURS SCHEDULED
Status: DISCONTINUED | OUTPATIENT
Start: 2021-04-05 | End: 2021-04-06 | Stop reason: HOSPADM

## 2021-04-05 RX ORDER — LISINOPRIL 10 MG/1
10 TABLET ORAL DAILY
Status: DISCONTINUED | OUTPATIENT
Start: 2021-04-05 | End: 2021-04-06 | Stop reason: HOSPADM

## 2021-04-05 RX ORDER — PROMETHAZINE HYDROCHLORIDE 25 MG/1
12.5 TABLET ORAL EVERY 6 HOURS PRN
Status: DISCONTINUED | OUTPATIENT
Start: 2021-04-05 | End: 2021-04-06 | Stop reason: HOSPADM

## 2021-04-05 RX ORDER — MULTIVITAMIN WITH IRON
1 TABLET ORAL DAILY
Status: DISCONTINUED | OUTPATIENT
Start: 2021-04-05 | End: 2021-04-06 | Stop reason: HOSPADM

## 2021-04-05 RX ORDER — LORAZEPAM 2 MG/ML
2 INJECTION INTRAMUSCULAR EVERY 4 HOURS PRN
Status: DISCONTINUED | OUTPATIENT
Start: 2021-04-05 | End: 2021-04-06 | Stop reason: HOSPADM

## 2021-04-05 RX ORDER — ACETAMINOPHEN 650 MG/1
650 SUPPOSITORY RECTAL EVERY 6 HOURS PRN
Status: DISCONTINUED | OUTPATIENT
Start: 2021-04-05 | End: 2021-04-06 | Stop reason: HOSPADM

## 2021-04-05 RX ORDER — POLYETHYLENE GLYCOL 3350 17 G/17G
17 POWDER, FOR SOLUTION ORAL DAILY PRN
Status: DISCONTINUED | OUTPATIENT
Start: 2021-04-05 | End: 2021-04-06 | Stop reason: HOSPADM

## 2021-04-05 RX ORDER — SODIUM CHLORIDE 0.9 % (FLUSH) 0.9 %
10 SYRINGE (ML) INJECTION PRN
Status: DISCONTINUED | OUTPATIENT
Start: 2021-04-05 | End: 2021-04-06 | Stop reason: HOSPADM

## 2021-04-05 RX ORDER — SODIUM CHLORIDE 9 MG/ML
25 INJECTION, SOLUTION INTRAVENOUS PRN
Status: DISCONTINUED | OUTPATIENT
Start: 2021-04-05 | End: 2021-04-06 | Stop reason: HOSPADM

## 2021-04-05 RX ORDER — ONDANSETRON 2 MG/ML
4 INJECTION INTRAMUSCULAR; INTRAVENOUS EVERY 6 HOURS PRN
Status: DISCONTINUED | OUTPATIENT
Start: 2021-04-05 | End: 2021-04-06 | Stop reason: HOSPADM

## 2021-04-05 RX ADMIN — LORAZEPAM 3 MG: 2 INJECTION INTRAMUSCULAR; INTRAVENOUS at 22:29

## 2021-04-05 RX ADMIN — LORAZEPAM 2 MG: 2 INJECTION INTRAMUSCULAR; INTRAVENOUS at 13:44

## 2021-04-05 RX ADMIN — LORAZEPAM 4 MG: 2 INJECTION INTRAMUSCULAR; INTRAVENOUS at 02:41

## 2021-04-05 RX ADMIN — THIAMINE HYDROCHLORIDE 100 MG: 100 INJECTION, SOLUTION INTRAMUSCULAR; INTRAVENOUS at 08:34

## 2021-04-05 RX ADMIN — LORAZEPAM 2 MG: 2 INJECTION INTRAMUSCULAR; INTRAVENOUS at 20:10

## 2021-04-05 RX ADMIN — LORAZEPAM 1 MG: 2 INJECTION INTRAMUSCULAR; INTRAVENOUS at 08:33

## 2021-04-05 RX ADMIN — ENOXAPARIN SODIUM 40 MG: 100 INJECTION SUBCUTANEOUS at 08:36

## 2021-04-05 RX ADMIN — Medication 10 ML: at 20:32

## 2021-04-05 RX ADMIN — LORAZEPAM 3 MG: 2 INJECTION INTRAMUSCULAR; INTRAVENOUS at 07:18

## 2021-04-05 RX ADMIN — LORAZEPAM 2 MG: 2 INJECTION INTRAMUSCULAR; INTRAVENOUS at 17:55

## 2021-04-05 RX ADMIN — LORAZEPAM 4 MG: 2 INJECTION INTRAMUSCULAR; INTRAVENOUS at 23:47

## 2021-04-05 RX ADMIN — LORAZEPAM 2 MG: 2 INJECTION INTRAMUSCULAR; INTRAVENOUS at 15:06

## 2021-04-05 RX ADMIN — LORAZEPAM 4 MG: 2 INJECTION INTRAMUSCULAR; INTRAVENOUS at 04:48

## 2021-04-05 RX ADMIN — LORAZEPAM 4 MG: 2 INJECTION INTRAMUSCULAR; INTRAVENOUS at 05:57

## 2021-04-05 RX ADMIN — LORAZEPAM 4 MG: 2 INJECTION INTRAMUSCULAR; INTRAVENOUS at 03:46

## 2021-04-05 RX ADMIN — LORAZEPAM 4 MG: 2 INJECTION INTRAMUSCULAR; INTRAVENOUS at 00:48

## 2021-04-05 RX ADMIN — Medication 10 ML: at 08:35

## 2021-04-05 RX ADMIN — ACETAMINOPHEN 650 MG: 325 TABLET ORAL at 17:55

## 2021-04-05 RX ADMIN — LORAZEPAM 1 MG: 2 INJECTION INTRAMUSCULAR; INTRAVENOUS at 09:33

## 2021-04-05 ASSESSMENT — PAIN SCALES - GENERAL
PAINLEVEL_OUTOF10: 0
PAINLEVEL_OUTOF10: 5

## 2021-04-05 NOTE — ED PROVIDER NOTES
ED Physician    HPI:  4/4/21, Time: 9:50 PM EDT         Buffy Claros is a 39 y.o. male presenting to the ED for concerns regarding possible seizure. Patient presents to the emergency department from home. Patient reports that the last thing he remembers he was standing up and then all sudden he woke up with family around him. Patient reports that he woke up on the floor. He states family reported that he looked like he had a seizure. Patient did have biting of his tongue. He denies any seizure history. Patient denied any leakage of urine or urinary incontinence. Patient does admit that he drinks alcohol but states that he only drinks about 3 times per week and usually it is just cheap vodka. He states that he did not drink at all today and in fact has not drank the last 3 days. He does have notable tremors states that its been 2 his hands/body for the last several months but admits that he has not followed up with a primary care doctor regarding this. Patient reports widespread body aches and pains. Patient otherwise denies any chest pain, shortness of breath, abdominal pain as well as no noted nausea, vomiting or diarrhea. Patient is denying any drug use. Specifically no tramadol or Ultram use. Patient does report that he had a fall over 4 weeks ago related to alcohol use and injured his left rib. Patient otherwise reports being in normal state of health. Denies chest pain denies shortness of breath denies any abdominal pain. He also has not had any vomiting or diarrhea. Does have tremors noted. Patient does report widespread body aches and pains but attributes likely to the fall versus a seizure that he just experienced.   Review of Systems:   A complete review of systems was performed and pertinent positives and negatives are stated within HPI, all other systems reviewed and are negative.          --------------------------------------------- PAST HISTORY ---------------------------------------------  Past Medical History:  has a past medical history of Boxer's metacarpal fracture, neck, closed. Past Surgical History:  has no past surgical history on file. Social History:  reports that he has been smoking cigarettes. He has been smoking about 0.25 packs per day. He has never used smokeless tobacco. He reports current alcohol use. He reports that he does not use drugs. Family History: family history includes Coronary Art Dis in his maternal uncle. The patients home medications have been reviewed. Allergies: Patient has no known allergies.     -------------------------------------------------- RESULTS -------------------------------------------------  All laboratory and radiology results have been personally reviewed by myself   LABS:  Results for orders placed or performed during the hospital encounter of 04/04/21   COVID-19, Rapid    Specimen: Nasopharyngeal Swab   Result Value Ref Range    SARS-CoV-2, NAAT Not Detected Not Detected   Troponin   Result Value Ref Range    Troponin <0.01 0.00 - 0.03 ng/mL   CBC Auto Differential   Result Value Ref Range    WBC 4.0 (L) 4.5 - 11.5 E9/L    RBC 4.26 3.80 - 5.80 E12/L    Hemoglobin 14.6 12.5 - 16.5 g/dL    Hematocrit 43.3 37.0 - 54.0 %    .6 (H) 80.0 - 99.9 fL    MCH 34.3 26.0 - 35.0 pg    MCHC 33.7 32.0 - 34.5 %    RDW 12.7 11.5 - 15.0 fL    Platelets 866 227 - 331 E9/L    MPV 10.8 7.0 - 12.0 fL    Neutrophils % 81.7 (H) 43.0 - 80.0 %    Lymphocytes % 7.8 (L) 20.0 - 42.0 %    Monocytes % 5.2 2.0 - 12.0 %    Eosinophils % 1.7 0.0 - 6.0 %    Basophils % 1.5 0.0 - 2.0 %    Neutrophils Absolute 3.32 1.80 - 7.30 E9/L    Lymphocytes Absolute 0.40 (L) 1.50 - 4.00 E9/L    Monocytes Absolute 0.20 0.10 - 0.95 E9/L    Eosinophils Absolute 0.07 0.05 - 0.50 E9/L    Basophils Absolute 0.00 0.00 - 0.20 E9/L    Atypical Lymphocytes Relative 1.7 0.0 - 4.0 %    Metamyelocytes Relative 1.7 (H) 0.0 - 1.0 % Anisocytosis 2+     Polychromasia 1+    Comprehensive Metabolic Panel   Result Value Ref Range    Sodium 134 132 - 146 mmol/L    Potassium 4.2 3.5 - 5.0 mmol/L    Chloride 95 (L) 98 - 107 mmol/L    CO2 23 22 - 29 mmol/L    Anion Gap 16 7 - 16 mmol/L    Glucose 179 (H) 74 - 99 mg/dL    BUN 9 6 - 20 mg/dL    CREATININE 0.8 0.7 - 1.2 mg/dL    GFR Non-African American >60 >=60 mL/min/1.73    GFR African American >60     Calcium 9.5 8.6 - 10.2 mg/dL    Total Protein 7.2 6.4 - 8.3 g/dL    Albumin 4.5 3.5 - 5.2 g/dL    Total Bilirubin 1.4 (H) 0.0 - 1.2 mg/dL    Alkaline Phosphatase 93 40 - 129 U/L     (H) 0 - 40 U/L     (H) 0 - 39 U/L   Magnesium   Result Value Ref Range    Magnesium 1.9 1.6 - 2.6 mg/dL   Lactic Acid, Plasma   Result Value Ref Range    Lactic Acid 3.2 (H) 0.5 - 2.2 mmol/L   Serum Drug Screen   Result Value Ref Range    Ethanol Lvl <10 mg/dL    Acetaminophen Level <5.0 (L) 10.0 - 33.7 mcg/mL    Salicylate, Serum <4.8 0.0 - 30.0 mg/dL    TCA Scrn NEGATIVE Cutoff:300 ng/mL   Urine Drug Screen   Result Value Ref Range    Amphetamine Screen, Urine NOT DETECTED Negative <1000 ng/mL    Barbiturate Screen, Ur NOT DETECTED Negative < 200 ng/mL    Benzodiazepine Screen, Urine NOT DETECTED Negative < 200 ng/mL    Cannabinoid Scrn, Ur NOT DETECTED Negative < 50ng/mL    Cocaine Metabolite Screen, Urine NOT DETECTED Negative < 300 ng/mL    Opiate Scrn, Ur NOT DETECTED Negative < 300ng/mL    PCP Screen, Urine NOT DETECTED Negative < 25 ng/mL    Methadone Screen, Urine NOT DETECTED Negative <300 ng/mL    Oxycodone Urine NOT DETECTED Negative <100 ng/mL    FENTANYL SCREEN, URINE NOT DETECTED Negative <1 ng/mL    Drug Screen Comment: see below    POCT Glucose   Result Value Ref Range    Glucose 174 mg/dL   EKG 12 Lead   Result Value Ref Range    Ventricular Rate 97 BPM    Atrial Rate 97 BPM    P-R Interval 130 ms    QRS Duration 74 ms    Q-T Interval 350 ms    QTc Calculation (Bazett) 444 ms    P Axis 71 degrees    R Axis 86 degrees    T Axis 35 degrees       RADIOLOGY:  Interpreted by Radiologist.  CT HEAD WO CONTRAST   Final Result   No CT evidence of an acute intracranial abnormality. No evidence of acute fracture or traumatic malalignment of the cervical spine. CT CERVICAL SPINE WO CONTRAST   Final Result   No CT evidence of an acute intracranial abnormality. No evidence of acute fracture or traumatic malalignment of the cervical spine. XR LUMBAR SPINE (MIN 4 VIEWS)   Final Result   1. No evidence of displaced rib fractures or complications related to rib   fractures. No acute cardiopulmonary pathology. 2.  13 rib-bearing vertebral bodies identified which is a normal variant. Preserved vertebral body height and alignment. Minimal early endplate   spondylosis in the midthoracic spine. 3.  No acute osseous findings in the lumbar spine. Normal vertebral body   height and alignment. XR THORACIC SPINE (MIN 4 VIEWS)   Final Result   1. No evidence of displaced rib fractures or complications related to rib   fractures. No acute cardiopulmonary pathology. 2.  13 rib-bearing vertebral bodies identified which is a normal variant. Preserved vertebral body height and alignment. Minimal early endplate   spondylosis in the midthoracic spine. 3.  No acute osseous findings in the lumbar spine. Normal vertebral body   height and alignment. XR CHEST PORTABLE   Final Result   1. No evidence of displaced rib fractures or complications related to rib   fractures. No acute cardiopulmonary pathology. 2.  13 rib-bearing vertebral bodies identified which is a normal variant. Preserved vertebral body height and alignment. Minimal early endplate   spondylosis in the midthoracic spine. 3.  No acute osseous findings in the lumbar spine.   Normal vertebral body   height and alignment.             ------------------------- NURSING NOTES AND VITALS REVIEWED ---------------------------   The nursing notes within the ED encounter and vital signs as below have been reviewed. BP (!) 160/90   Pulse 98   Temp 97.3 °F (36.3 °C) (Tympanic)   Resp 20   Wt 180 lb (81.6 kg)   SpO2 95%   BMI 24.41 kg/m²   Oxygen Saturation Interpretation: Normal      ---------------------------------------------------PHYSICAL EXAM--------------------------------------      Constitutional/General: Alert and oriented x3, moderately uncomfortable  Head: Normocephalic and atraumatic  Eyes: PERRL, EOMI  Mouth: Oropharynx clear, handling secretions, no trismus  Neck: Supple, full ROM,   Pulmonary: Lungs clear to auscultation bilaterally, no wheezes, rales, or rhonchi. Not in respiratory distress  Cardiovascular:  Regular rate and rhythm, no murmurs, gallops, or rubs. 2+ distal pulses  Abdomen: Soft, non tender, non distended, no area of point tenderness. Extremities: Moves all extremities x 4. Warm and well perfused  Skin: warm and dry without rash  Neurologic: GCS 15, annual nerves II through XII grossly intact. No acute neurovascular deficit noted.   Speech clear and coherent strength strong and equal bilaterally, patient with tremors noted  Psych: Normal Affect      ------------------------------ ED COURSE/MEDICAL DECISION MAKING----------------------  Medications   sodium chloride flush 0.9 % injection 10 mL (0 mLs Intravenous Held 4/4/21 2203)   sodium chloride flush 0.9 % injection 10 mL (has no administration in time range)   0.9 % sodium chloride infusion (has no administration in time range)   LORazepam (ATIVAN) tablet 1 mg ( Oral See Alternative 4/5/21 0048)     Or   LORazepam (ATIVAN) injection 1 mg ( Intravenous See Alternative 4/5/21 0048)     Or   LORazepam (ATIVAN) tablet 2 mg ( Oral See Alternative 4/5/21 0048)     Or   LORazepam (ATIVAN) injection 2 mg ( Intravenous See Alternative 4/5/21 0048)     Or   LORazepam (ATIVAN) tablet 3 mg ( Oral See Alternative 4/5/21 0048)     Or LORazepam (ATIVAN) injection 3 mg ( Intravenous See Alternative 4/5/21 0048)     Or   LORazepam (ATIVAN) tablet 4 mg ( Oral See Alternative 4/5/21 0048)     Or   LORazepam (ATIVAN) injection 4 mg (4 mg Intravenous Given 4/5/21 0048)   thiamine (B-1) injection 100 mg (has no administration in time range)   0.9 % sodium chloride bolus (0 mLs Intravenous Stopped 4/4/21 2340)   levetiracetam (KEPPRA) 500 mg/100 mL IVPB (0 mg Intravenous Stopped 4/4/21 2214)         ED COURSE:       Medical Decision Making: Plan be for labs will also obtain imaging. Patient likely with alcohol induced seizure. Labs resulted troponin negative, chemistry panel with elevated liver enzymes consistent with alcoholism. CBC with white blood cell count slightly low at 4 otherwise all within normal limits, magnesium level normal, lactic acid level elevated at 3.2, alcohol level negative. COVID-19 test negative, urine drug screen negative, serum drug screen negative. Twelve-lead EKG interpreted showing heart rate of 97, normal sinus rhythm. No acute ST elevation or depression noted. Right axis deviation. CIWA protocol initiated. Patient was medicated with IV Ativan here also did receive IV Keppra as well as IV thiamine. CT scan of brain resulted showing no evidence of any acute intracranial abnormality. CT cervical spine showed no evidence of any fracture or traumatic malalignment of the cervical spine. X-ray of the lumbar spine showing no evidence of any displaced rib fractures or complications related to fractures. Otherwise no acute cardiopulmonary pathology. Patient likely with alcohol withdrawal induced seizure. Patient with seizure precautions initiated here. No further seizure activity noted. He was provided IV Ativan for his tremors. Patient will be admitted to telemetry inpatient. Call out to sound. Plan will be for admission.   I did speak with Dr. Castillo Villafana he was agreeable to admit patient to telemetry inpatient I also did discuss results with patient himself he is also agreeable to be admitted. Patient likely with seizures related to alcohol withdrawal.  Patient otherwise neurovascular intact. He is completely awake alert oriented x4. He is resting comfortably. He was provided with IV Ativan here. Counseling: The emergency provider has spoken with the patient and discussed todays results, in addition to providing specific details for the plan of care and counseling regarding the diagnosis and prognosis. Questions are answered at this time and they are agreeable with the plan.      --------------------------------- IMPRESSION AND DISPOSITION ---------------------------------    IMPRESSION  1. New onset seizure ([facility] [address] [facility]    2. [facility]        DISPOSITION  Disposition: Admit to telemetry  Patient condition is good      NOTE: This report was transcribed using voice recognition software.  Every effort was made to ensure accuracy; however, inadvertent computerized transcription errors may be present     JEREMIAH Cutler - CNP  04/05/21 0111

## 2021-04-05 NOTE — H&P
INTERNAL MEDICINE HISTORY AND PHYSICAL EXAM     CHIEF COMPLAINT:   Chief Complaint   Patient presents with    Tremors     tremors, reports having syncopal episode today        HISTORY OF PRESENTING ILLNESS:   39 y.o. male presenting to the ED for concerns regarding possible seizure. Patient presents to the emergency department from home. Patient reports that the last thing he remembers he was standing up and then all sudden he woke up with family around him. Patient reports that he woke up on the floor. He states family reported that he looked like he had a seizure. Patient did have biting of his tongue. He denies any seizure history. Patient denied any leakage of urine or urinary incontinence. Patient does admit that he drinks alcohol but states that he only drinks about 3 times per week and usually  vodka. He states that he did not drink at all today and in fact has not drank the last 3 days. Patient otherwise denies any chest pain, shortness of breath, abdominal pain as well as no noted nausea, vomiting or diarrhea. Patient is denying any drug use. Specifically no tramadol or Ultram use. Patient does report that he had a fall over 4 weeks ago related to alcohol use and injured his left rib. Labs resulted troponin negative, chemistry panel with elevated liver enzymes consistent with alcoholism. CBC with white blood cell count slightly low at 4 otherwise all within normal limits, magnesium level normal, lactic acid level elevated at 3.2, alcohol level negative. COVID-19 test negative, urine drug screen negative, serum drug screen negative. welve-lead EKG interpreted showing heart rate of 97, normal sinus rhythm. No acute ST elevation or depression noted. Right axis deviation. CT scan of brain resulted showing no evidence of any acute intracranial abnormality. CT cervical spine showed no evidence of any fracture or traumatic malalignment of the cervical spine. X-ray of the lumbar spine showing no evidence of any displaced rib fractures or complications related to fractures. Otherwise no acute cardiopulmonary pathology. Patient likely with alcohol withdrawal induced seizure. [facility] protocol initiated. PAST MEDICAL HISTORY  Past Medical History:   Diagnosis Date    Boxer's metacarpal fracture, neck, closed 2009    New onset seizure ([facility] [address] 4/5/2021       PAST SURGICAL HISTORY  No past surgical history on file. FAMILY HISTORY  Family History   Problem Relation Age of Onset    Coronary Art Dis Maternal Uncle         46s          SOCIAL HISTORY   reports that he has been smoking cigarettes. He has been smoking about 0.25 packs per day. He has never used smokeless tobacco. He reports current alcohol use. He reports that he does not use drugs. MEDICATIONS   Not in a hospital admission.   Current Facility-Administered Medications   Medication Dose Route Frequency Provider Last Rate Last Admin    sodium chloride flush 0.9 % injection 10 mL  10 mL Intravenous 2 times per day Heidi Guzman MD        sodium chloride flush 0.9 % injection 10 mL  10 mL Intravenous PRN Heidi Guzman MD        0.9 % sodium chloride infusion  25 mL Intravenous PRN Heidi Guzman MD        enoxaparin (LOVENOX) injection 40 mg  40 mg Subcutaneous Daily Heidi Guzman MD        promethazine (PHENERGAN) tablet 12.5 mg  12.5 mg Oral Q6H PRN Heidi Guzman MD        Or    ondansetron (ZOFRAN) injection 4 mg  4 mg Intravenous Q6H PRN Heidi Guzman MD        polyethylene glycol (GLYCOLAX) packet 17 g  17 g Oral Daily PRN Heidi Guzman MD        acetaminophen (TYLENOL) tablet 650 mg  650 mg Oral Q6H PRN Heidi Guzman MD        Or   [facility] acetaminophen (TYLENOL) suppository 650 mg  650 mg Rectal Q6H PRN Heidi Guzman MD        0.9 % sodium chloride infusion  25 mL Intravenous PRN Jessica Sammie APRMIREYA - CNP        LORazepam (ATIVAN) tablet 1 mg  1 mg Oral Q1H PRN Jessica Sammie, APRN - CNP Or    LORazepam (ATIVAN) injection 1 mg  1 mg Intravenous Q1H PRN Barrett Trish, APRN - CNP        Or    LORazepam (ATIVAN) tablet 2 mg  2 mg Oral Q1H PRN Barrett Trish, APRN - CNP        Or    LORazepam (ATIVAN) injection 2 mg  2 mg Intravenous Q1H PRN Barrett Trish, APRN - CNP        Or    LORazepam (ATIVAN) tablet 3 mg  3 mg Oral Q1H PRN Barrett Trish, APRN - CNP        Or    LORazepam (ATIVAN) injection 3 mg  3 mg Intravenous Q1H PRN Barrett Trish, APRN - CNP        Or    LORazepam (ATIVAN) tablet 4 mg  4 mg Oral Q1H PRN Barrett Trish, APRN - CNP        Or    LORazepam (ATIVAN) injection 4 mg  4 mg Intravenous Q1H PRN Barrett Trish, APRN - CNP   4 mg at 04/05/21 0448    thiamine (B-1) injection 100 mg  100 mg Intravenous Daily Barrett Trish, APRN - CNP         Current Outpatient Medications   Medication Sig Dispense Refill    clobetasol (TEMOVATE) 0.05 % cream Apply to the affected area twice per day for 7-10 days. 1 Tube 0    betamethasone dipropionate (DIPROLENE) 0.05 % cream Apply topically 2 times daily. 45 g 0    lisinopril (PRINIVIL;ZESTRIL) 10 MG tablet take 1 tablet by mouth once daily 90 tablet 1    clotrimazole-betamethasone (LOTRISONE) 1-0.05 % cream Apply topically 2 times daily. 15 g 0    Misc. Devices (WRIST BRACE) MISC Soft neutral position right wrist brace  Size to fit  Dx:  Wrist pain/carpal tunnel syndrome 1 each 0     Prior to Admission medications    Medication Sig Start Date End Date Taking? Authorizing Provider   clobetasol (TEMOVATE) 0.05 % cream Apply to the affected area twice per day for 7-10 days. 12/28/20   JEREMIAH Whitney - VANDANA   betamethasone dipropionate (DIPROLENE) 0.05 % cream Apply topically 2 times daily.  12/24/20   Conchita Rose APRN - CNP   lisinopril (PRINIVIL;ZESTRIL) 10 MG tablet take 1 tablet by mouth once daily 11/10/20   Jyoti Charles MD   clotrimazole-betamethasone (LOTRISONE) 1-0.05 % cream Apply topically 2 times daily. 2/11/20   Rachell Snellen, APRN - CNP   [facility]. Devices (WRIST BRACE) MISC Soft neutral position right wrist brace  Size to fit  Dx:  Wrist pain/carpal tunnel syndrome 2/13/19   Milena Hamilton MD       ALLERGIES   Patient has no known allergies. REVIEW OF SYSTEMS:  12 point review of system was done in detail with the patient and is negative except as above in HPI.     PHYSICAL EXAM:  VS: BP (!) 164/102   Pulse 99   Temp 97.3 °F (36.3 °C) (Tympanic)   Resp 20   Ht 6' (1.829 m)   Wt 180 lb (81.6 kg)   SpO2 95%   BMI 24.41 kg/m²   General Appearance: alert and oriented to person, place and time, in no acute distress  HEENT: normocephalic and atraumatic, pupils equal, round, and reactive to light, Ssupple and non-tender without mass, no thyromegaly   Cardiovascular: normal rate, regular rhythm, normal S1 and S2, no murmurs, rubs, clicks, or gallops, distal pulses intact, no carotid bruits, no JVD  Pulmonary/Chest: clear to auscultation bilaterally- no wheezes, rales or rhonchi, normal air movement, no respiratory distress  Abdomen: soft, non-tender, non-distended, normal bowel sounds, no masses   Extremities: no cyanosis, clubbing or edema, pulse   Musculoskeletal: normal range of motion, no joint swelling, deformity or tenderness  Neurological: alert, oriented, normal speech, no focal findings or movement disorder noted  Skin: warm and dry, no rash or erythema    LABS:  Recent Results (from the past 24 hour(s))   Troponin    Collection Time: 04/04/21  9:54 PM   Result Value Ref Range    Troponin <0.01 0.00 - 0.03 ng/mL   CBC Auto Differential    Collection Time: 04/04/21  9:54 PM   Result Value Ref Range    WBC 4.0 (L) 4.5 - 11.5 E9/L    RBC 4.26 3.80 - 5.80 E12/L    Hemoglobin 14.6 12.5 - 16.5 g/dL    Hematocrit 43.3 37.0 - 54.0 %    .6 (H) 80.0 - 99.9 fL    MCH 34.3 26.0 - 35.0 pg    MCHC 33.7 32.0 - 34.5 %    RDW 12.7 11.5 - 15.0 fL    Platelets 757 483 - 136 E9/L    MPV 10.8 7.0 - 12.0 fL Neutrophils % 81.7 (H) 43.0 - 80.0 %    Lymphocytes % 7.8 (L) 20.0 - 42.0 %    Monocytes % 5.2 2.0 - 12.0 %    Eosinophils % 1.7 0.0 - 6.0 %    Basophils % 1.5 0.0 - 2.0 %    Neutrophils Absolute 3.32 1.80 - 7.30 E9/L    Lymphocytes Absolute 0.40 (L) 1.50 - 4.00 E9/L    Monocytes Absolute 0.20 0.10 - 0.95 E9/L    Eosinophils Absolute 0.07 0.05 - 0.50 E9/L    Basophils Absolute 0.00 0.00 - 0.20 E9/L    Atypical Lymphocytes Relative 1.7 0.0 - 4.0 %    Metamyelocytes Relative 1.7 (H) 0.0 - 1.0 %    Anisocytosis 2+     Polychromasia 1+    Comprehensive Metabolic Panel    Collection Time: 04/04/21  9:54 PM   Result Value Ref Range    Sodium 134 132 - 146 mmol/L    Potassium 4.2 3.5 - 5.0 mmol/L    Chloride 95 (L) 98 - 107 mmol/L    CO2 23 22 - 29 mmol/L    Anion Gap 16 7 - 16 mmol/L    Glucose 179 (H) 74 - 99 mg/dL    BUN 9 6 - 20 mg/dL    CREATININE 0.8 0.7 - 1.2 mg/dL    GFR Non-African American >60 >=60 mL/min/1.73    GFR African American >60     Calcium 9.5 8.6 - 10.2 mg/dL    Total Protein 7.2 6.4 - 8.3 g/dL    Albumin 4.5 3.5 - 5.2 g/dL    Total Bilirubin 1.4 (H) 0.0 - 1.2 mg/dL    Alkaline Phosphatase 93 40 - 129 U/L     (H) 0 - 40 U/L     (H) 0 - 39 U/L   Magnesium    Collection Time: 04/04/21  9:54 PM   Result Value Ref Range    Magnesium 1.9 1.6 - 2.6 mg/dL   Lactic Acid, Plasma    Collection Time: 04/04/21  9:54 PM   Result Value Ref Range    Lactic Acid 3.2 (H) 0.5 - 2.2 mmol/L   Serum Drug Screen    Collection Time: 04/04/21  9:54 PM   Result Value Ref Range    Ethanol Lvl <10 mg/dL    Acetaminophen Level <5.0 (L) 10.0 - 72.4 mcg/mL    Salicylate, Serum <9.6 0.0 - 30.0 mg/dL    TCA Scrn NEGATIVE Cutoff:300 ng/mL   EKG 12 Lead    Collection Time: 04/04/21  9:54 PM   Result Value Ref Range    Ventricular Rate 97 BPM    Atrial Rate 97 BPM    P-R Interval 130 ms    QRS Duration 74 ms    Q-T Interval 350 ms    QTc Calculation (Bazett) 444 ms    P Axis 71 degrees    R Axis 86 degrees    T Axis 35 activity Decision Support Exception->Emergency Medical Condition (MA) What reading provider will be dictating this exam?->CRC FINDINGS: CERVICAL SPINE: BONES/ALIGNMENT: There is no acute fracture or traumatic malalignment. DEGENERATIVE CHANGES: No significant degenerative changes. SOFT TISSUES: There is no prevertebral soft tissue swelling. HEAD: BRAIN/VENTRICLES: There is no acute intracranial hemorrhage, mass effect or midline shift. No abnormal extra-axial fluid collection. The gray-white differentiation is maintained without evidence of an acute infarct. There is no evidence of hydrocephalus. ORBITS: The visualized portion of the orbits demonstrate no acute abnormality. SINUSES: The visualized paranasal sinuses and mastoid air cells demonstrate no acute abnormality. SOFT TISSUES/SKULL: No acute abnormality of the visualized skull or soft tissues. No CT evidence of an acute intracranial abnormality. No evidence of acute fracture or traumatic malalignment of the cervical spine. Ct Cervical Spine Wo Contrast    Result Date: 4/5/2021  EXAMINATION: CT OF THE HEAD WITHOUT CONTRAST; CT OF THE CERVICAL SPINE WITHOUT CONTRAST 4/4/2021 11:26 pm TECHNIQUE: CT of the head was performed without the administration of intravenous contrast. Dose modulation, iterative reconstruction, and/or weight based adjustment of the mA/kV was utilized to reduce the radiation dose to as low as reasonably achievable.; CT of the cervical spine was performed without the administration of intravenous contrast. Multiplanar reformatted images are provided for review. Dose modulation, iterative reconstruction, and/or weight based adjustment of the mA/kV was utilized to reduce the radiation dose to as low as reasonably achievable. COMPARISON: None. HISTORY: ORDERING SYSTEM PROVIDED HISTORY: seizure like activity TECHNOLOGIST PROVIDED HISTORY: Has a \"code stroke\" or \"stroke alert\" been called? ->No Reason for exam:->seizure like activity Decision Support Exception->Emergency Medical Condition (MA) What reading provider will be dictating this exam?->CRC FINDINGS: CERVICAL SPINE: BONES/ALIGNMENT: There is no acute fracture or traumatic malalignment. DEGENERATIVE CHANGES: No significant degenerative changes. SOFT TISSUES: There is no prevertebral soft tissue swelling. HEAD: BRAIN/VENTRICLES: There is no acute intracranial hemorrhage, mass effect or midline shift. No abnormal extra-axial fluid collection. The gray-white differentiation is maintained without evidence of an acute infarct. There is no evidence of hydrocephalus. ORBITS: The visualized portion of the orbits demonstrate no acute abnormality. SINUSES: The visualized paranasal sinuses and mastoid air cells demonstrate no acute abnormality. SOFT TISSUES/SKULL: No acute abnormality of the visualized skull or soft tissues. No CT evidence of an acute intracranial abnormality. No evidence of acute fracture or traumatic malalignment of the cervical spine. Xr Chest Portable    Result Date: 4/4/2021  EXAMINATION: ONE XRAY VIEW OF THE CHEST; FOUR XRAY VIEWS OF THE THORACIC SPINE; 5 XRAY VIEWS OF THE LUMBAR SPINE 4/4/2021 10:56 pm COMPARISON: None. HISTORY: ORDERING SYSTEM PROVIDED HISTORY: [facility] TECHNOLOGIST PROVIDED HISTORY: Reason for exam:->seiure What reading provider will be dictating this exam?->CRC FINDINGS: CHEST: There appear to be coarsened interstitial markings. There are no formed consolidations, pleural effusions, or pneumothoraces. Trachea and central mainstem bronchi appear clear. The cardiomediastinal silhouette and pulmonary vascularity appear within normal limits. Osseous and thoracic soft tissue structures demonstrate no acute findings. THORACIC SPINE: There are 13 rib-bearing vertebral bodies. AP alignment appears preserved. No displaced rib fractures. Thoracolumbar junction is unremarkable. The lateral vertebral body height and alignment is maintained.   Mild multilevel endplate spondylosis in the thoracic spine. Imaged cervical spine on swimmer's view is grossly unremarkable. Lungs and mediastinum as imaged appear unremarkable. LUMBAR SPINE: 5 non-rib-bearing lumbar type vertebral bodies. AP alignment is normal.  No spondylolysis. Lateral vertebral body height and alignment is maintained. No significant endplate spondylosis, disc space narrowing, nor facet sclerosis. The SI joints appear open. Overlying bowel gas pattern is normal.     1.  No evidence of displaced rib fractures or complications related to rib fractures. No acute cardiopulmonary pathology. 2.  13 rib-bearing vertebral bodies identified which is a normal variant. Preserved vertebral body height and alignment. Minimal early endplate spondylosis in the midthoracic spine. 3.  No acute osseous findings in the lumbar spine. Normal vertebral body height and alignment. Xr Thoracic Spine (min 4 Views)    Result Date: 4/4/2021  EXAMINATION: ONE XRAY VIEW OF THE CHEST; FOUR XRAY VIEWS OF THE THORACIC SPINE; 5 XRAY VIEWS OF THE LUMBAR SPINE 4/4/2021 10:56 pm COMPARISON: None. HISTORY: ORDERING SYSTEM PROVIDED HISTORY: [employer] TECHNOLOGIST PROVIDED HISTORY: Reason for exam:->seiure What reading provider will be dictating this exam?->CRC FINDINGS: CHEST: There appear to be coarsened interstitial markings. There are no formed consolidations, pleural effusions, or pneumothoraces. Trachea and central mainstem bronchi appear clear. The cardiomediastinal silhouette and pulmonary vascularity appear within normal limits. Osseous and thoracic soft tissue structures demonstrate no acute findings. THORACIC SPINE: There are 13 rib-bearing vertebral bodies. AP alignment appears preserved. No displaced rib fractures. Thoracolumbar junction is unremarkable. The lateral vertebral body height and alignment is maintained. Mild multilevel endplate spondylosis in the thoracic spine.   Imaged cervical spine on swimmer's view is grossly unremarkable. Lungs and mediastinum as imaged appear unremarkable. LUMBAR SPINE: 5 non-rib-bearing lumbar type vertebral bodies. AP alignment is normal.  No spondylolysis. Lateral vertebral body height and alignment is maintained. No significant endplate spondylosis, disc space narrowing, nor facet sclerosis. The SI joints appear open. Overlying bowel gas pattern is normal.     1.  No evidence of displaced rib fractures or complications related to rib fractures. No acute cardiopulmonary pathology. 2.  13 rib-bearing vertebral bodies identified which is a normal variant. Preserved vertebral body height and alignment. Minimal early endplate spondylosis in the midthoracic spine. 3.  No acute osseous findings in the lumbar spine. Normal vertebral body height and alignment. ASSESSMENT AND PLAN  Active Problems:    New onset seizure ([address]    Seizure ([address]  Resolved Problems:    * No resolved hospital problems. *    Impression  Acute seizure, likely alcohol withdrawal, cannot exclude nonalcohol-related seizure disorder, CT head negative  Hypertension    Plan  CIWA protocol initiated will continue that  We will consult neurology for evaluation  Continue home medication for chronic stable medical condition  Incoming hospitalist to follow-up    DVT prophylaxis: Subcutaneous heparin  CODE STATUS: Patient is a full code  Discharge plan: Patient can be discharged next 3 to 4 days to home with and without home health.,  Pending clinical improvement, pending work-up and clearance by consulting services. Please note that over 35 minutes was spent in evaluating the patient, review of records and results, discussion with staff/family, etc.    Syed Gonzales MD  [facility]   [phone]    NOTE: This report was transcribed using voice recognition software. Every effort was made to ensure accuracy; however, inadvertent computerized transcription errors may be present.

## 2021-04-05 NOTE — PLAN OF CARE
Problem: Falls - Risk of:  Goal: Will remain free from falls  Description: Will remain free from falls  Outcome: Met This Shift  Goal: Absence of physical injury  Description: Absence of physical injury  Outcome: Met This Shift     Problem: Coping:  Goal: Ability to cope will improve  Description: Ability to cope will improve  Outcome: Met This Shift  Goal: Ability to identify appropriate support needs will improve  Description: Ability to identify appropriate support needs will improve  Outcome: Met This Shift     Problem: Health Behavior:  Goal: Ability to manage health-related needs will improve  Description: Ability to manage health-related needs will improve  Outcome: Met This Shift     Problem: Physical Regulation:  Goal: Signs of adequate cerebral perfusion will increase  Description: Signs of adequate cerebral perfusion will increase  Outcome: Met This Shift  Goal: Ability to maintain a stable neurologic state will improve  Description: Ability to maintain a stable neurologic state will improve  Outcome: Met This Shift     Problem: Safety:  Goal: Ability to remain free from injury will improve  Description: Ability to remain free from injury will improve  Outcome: Met This Shift     Problem: Self-Concept:  Goal: Level of anxiety will decrease  Description: Level of anxiety will decrease  Outcome: Met This Shift  Goal: Ability to verbalize feelings about condition will improve  Description: Ability to verbalize feelings about condition will improve  Outcome: Met This Shift

## 2021-04-05 NOTE — PROGRESS NOTES
Hospitalist Progress Note      PCP: Beka Gonsalves MD    Date of Admission: 4/4/2021    Hospital Course: \"37 y.o. male presented for possible seizure. He states he was standing up and then recalls waking up with family surrounding him while he was on the floor. He states family reported that he looked like he had a seizure. He had recent fall over 4 weeks ago related to alcohol use and injured his left rib. Urine drug screen negative, serum drug screen negative. Patient had reduced his alcohol intake over the last few days. Patient likely with alcohol withdrawal induced seizure. Patient is admitted. [facility] protocol initiated. Subjective:   He is fatigued. He has insomnia. He wants to leave because he hasn't been sleeping. No further seizures. Medications:  Reviewed    Infusion Medications    sodium chloride      sodium chloride       Scheduled Medications    sodium chloride flush  10 mL Intravenous 2 times per day    enoxaparin  40 mg Subcutaneous Daily    multivitamin  1 tablet Oral Daily    lisinopril  10 mg Oral Daily    thiamine  100 mg Intravenous Daily     PRN Meds: sodium chloride flush, sodium chloride, promethazine **OR** ondansetron, polyethylene glycol, acetaminophen **OR** acetaminophen, LORazepam, sodium chloride, LORazepam **OR** LORazepam **OR** LORazepam **OR** LORazepam **OR** LORazepam **OR** LORazepam **OR** LORazepam **OR** LORazepam    No intake or output data in the 24 hours ending 04/05/21 1322    Physical Exam Performed:    BP (!) 142/104   Pulse 101   Temp 97.2 °F (36.2 °C)   Resp 18   Ht 6' (1.829 m)   Wt 180 lb (81.6 kg)   SpO2 98%   BMI 24.41 kg/m²     General appearance: No apparent distress, appears stated age and cooperative. HEENT: Pupils equal, round, and reactive to light. Conjunctivae/corneas clear. Neck: Supple, with full range of motion. No jugular venous distention. Trachea midline. Respiratory:  Normal respiratory effort.  Clear to auscultation, midthoracic spine. 3.  No acute osseous findings in the lumbar spine. Normal vertebral body   height and alignment. XR THORACIC SPINE (MIN 4 VIEWS)   Final Result   1. No evidence of displaced rib fractures or complications related to rib   fractures. No acute cardiopulmonary pathology. 2.  13 rib-bearing vertebral bodies identified which is a normal variant. Preserved vertebral body height and alignment. Minimal early endplate   spondylosis in the midthoracic spine. 3.  No acute osseous findings in the lumbar spine. Normal vertebral body   height and alignment. XR CHEST PORTABLE   Final Result   1. No evidence of displaced rib fractures or complications related to rib   fractures. No acute cardiopulmonary pathology. 2.  13 rib-bearing vertebral bodies identified which is a normal variant. Preserved vertebral body height and alignment. Minimal early endplate   spondylosis in the midthoracic spine. 3.  No acute osseous findings in the lumbar spine. Normal vertebral body   height and alignment. Assessment/Plan:    Active Hospital Problems    Diagnosis    New onset seizure ([address] [R56.9]    Seizure ([address] [R56.9]     Acute seizure-likely alcohol withdrawal  CT head negative  -He was given keppra in ER  -Seizure precautions  -CIWA protocol  -As needed Ativan as needed for seizures  -Neurology consultation     Alcohol abuse  -Pt had reduced intake  -Continue CIWA  -Continue thiamine and MVI  - consult for resources.     Hypertension  -BP elevated, restarted lisinopril     Elevated LFTs-likely due to alcohol use  -No alcohol on drug tox screen  -Check CK    DVT Prophylaxis: Lovenox  Diet: DIET GENERAL;  Code Status: Full Code    PT/OT Eval Status: Ordered    Dispo -Home in 1 to 2 days    Margaret Santiago MD

## 2021-04-05 NOTE — ED NOTES
Patient found up walking in halls, very unsteady, shaking, stating that he was ready to go home. Patient assisted back to bed. Sitter at bedside. Patient resting with eyes closed at this time.   Tremors noted to hands while at rest.      Bob Randolph RN  04/05/21 [id number]

## 2021-04-05 NOTE — CONSULTS
Jesus Alberto Grant is a 39 y.o. male       Chief Complaint   Patient presents with    Tremors     tremors, reports having syncopal episode today       HPI:  50-year-old man presents after seizure at home. He has no history of seizures. He does drink a fair amount of alcohol. Although he states he does not drink daily he does go through a gallon bottle of diluted vodka in about 4 days. He denies ever having symptoms of alcohol withdrawal.  He is currently feeling sore and tremulous with hiccups. His labs were notable for elevated AST greater than ALT. his tox screen and serum alcohol were both negative. He endorses that he had not had a drink. Prior have been trying to cut back if his alcohol intake. He is adopted and does not know his family history. As far as he knows his development was normal.   In the ER he has shown signs of active alcohol withdrawal and has received a total of 29 mg of ativan  in addition to being loaded with Keppra. I Spoke with pts mother. He lives in the basement around she believes he does drink more than he lets on. His girlfriend at mother of his children is also struggling with alcoholism and has gone to rehab in the past.  She is not sure exactly how much she drinks but when she goes into the basement to clean she often finds many many empty gallon vodka container. HPI  Prior to Visit Medications    Medication Sig Taking?  Authorizing Provider   lisinopril (PRINIVIL;ZESTRIL) 10 MG tablet Take 10 mg by mouth daily Yes Historical Provider, MD     Social History     Tobacco Use    Smoking status: Current Every Day Smoker     Packs/day: 0.25     Types: Cigarettes    Smokeless tobacco: Never Used   Substance Use Topics    Alcohol use: Yes     Comment: vodka daily, frequent weekend binge drinking with 8 shots daily    Drug use: No     Family History   Problem Relation Age of Onset    Coronary Art Dis Maternal Uncle         46s     No past surgical history on file.  Past Medical History:   Diagnosis Date    Boxer's metacarpal fracture, neck, closed 2009    New onset seizure ([facility] [address] 4/5/2021     Review of Systems    As stated above all others negative. Objective:   /78   Pulse 128   Temp 97.3 °F (36.3 °C) (Tympanic)   Resp 18   Ht 6' (1.829 m)   Wt 180 lb (81.6 kg)   SpO2 97%   BMI 24.41 kg/m²     Physical Exam  Constitutional:       General: He is not in acute distress. HENT:      Head: Normocephalic and atraumatic. Mouth/Throat:      Comments: Right lateral tongue bite  Eyes:      Extraocular Movements: Extraocular movements intact. Conjunctiva/sclera: Conjunctivae normal.      Pupils: Pupils are equal, round, and reactive to light. Neck:      Musculoskeletal: Neck supple. Cardiovascular:      Rate and Rhythm: Regular rhythm. Tachycardia present. Pulses: Normal pulses. Pulmonary:      Effort: Pulmonary effort is normal.      Breath sounds: Normal breath sounds. Abdominal:      Palpations: Abdomen is soft. Skin:     General: Skin is warm and dry. Neurological:      General: No focal deficit present. Deep Tendon Reflexes: Strength normal.   Psychiatric:         Speech: Speech normal.                 Neurological Exam  Mental Status  Drowsy. Oriented only to person, place and situation. Speech is normal. Language is fluent with no aphasia. Fund of knowledge is appropriate for level of education. Cranial Nerves  CN II: Visual fields full to confrontation. CN III, IV, VI: Extraocular movements intact bilaterally. Pupils equal round and reactive to light bilaterally. CN VII: Full and symmetric facial movement. CN VIII: Hearing is normal.  CN IX, X: Palate elevates symmetrically  CN XI: Shoulder shrug strength is normal.  CN XII: Tongue midline without atrophy or fasciculations. Motor  Normal muscle bulk throughout. Normal muscle tone. The following abnormal movements were seen: Tremor with outstretched hands. Aleida Kid Strength is 5/5 throughout all four extremities. Laboratory/Radiology:     CBC with Differential:    Lab Results   Component Value Date    WBC 5.1 04/05/2021    RBC 4.06 04/05/2021    HGB 14.3 04/05/2021    HCT 41.8 04/05/2021     04/05/2021    .0 04/05/2021    MCH 35.2 04/05/2021    MCHC 34.2 04/05/2021    RDW 12.7 04/05/2021    METASPCT 1.7 04/04/2021    LYMPHOPCT 7.8 04/04/2021    MONOPCT 5.2 04/04/2021    BASOPCT 1.5 04/04/2021    MONOSABS 0.20 04/04/2021    LYMPHSABS 0.40 04/04/2021    EOSABS 0.07 04/04/2021    BASOSABS 0.00 04/04/2021     CMP:    Lab Results   Component Value Date     04/05/2021    K 3.9 04/05/2021     04/05/2021    CO2 21 04/05/2021    BUN 7 04/05/2021    CREATININE 0.8 04/05/2021    GFRAA >60 04/05/2021    LABGLOM >60 04/05/2021    GLUCOSE 96 04/05/2021    PROT 6.8 04/05/2021    LABALBU 4.3 04/05/2021    CALCIUM 9.1 04/05/2021    BILITOT 1.1 04/05/2021    ALKPHOS 83 04/05/2021     04/05/2021     04/05/2021       CT head:  Negative    I independently reviewed the labs and imaging studies at today's appointment. Assessment:     Based on history from patient and collateral information I suspect these are ETOH withdrawal seizures. Plan:     Continue to treat ETOH withdrawal.   Neurology will be available as needed.         Sunday Warner  10:24 AM  4/5/2021

## 2021-04-05 NOTE — ED NOTES
Patient resting in bed with eyes closed at this time, respirations unlabored, patient appears calm and relaxed at this time.       Rufino Strong RN  04/05/21 8895

## 2021-04-06 VITALS
HEART RATE: 135 BPM | OXYGEN SATURATION: 96 % | SYSTOLIC BLOOD PRESSURE: 139 MMHG | RESPIRATION RATE: 20 BRPM | WEIGHT: 180 LBS | BODY MASS INDEX: 24.38 KG/M2 | HEIGHT: 72 IN | TEMPERATURE: 97.7 F | DIASTOLIC BLOOD PRESSURE: 77 MMHG

## 2021-04-06 LAB
ALBUMIN SERPL-MCNC: 4.5 G/DL (ref 3.5–5.2)
ALP BLD-CCNC: 82 U/L (ref 40–129)
ALT SERPL-CCNC: 108 U/L (ref 0–40)
ANION GAP SERPL CALCULATED.3IONS-SCNC: 13 MMOL/L (ref 7–16)
AST SERPL-CCNC: 166 U/L (ref 0–39)
BILIRUB SERPL-MCNC: 0.7 MG/DL (ref 0–1.2)
BUN BLDV-MCNC: 8 MG/DL (ref 6–20)
CALCIUM SERPL-MCNC: 9.2 MG/DL (ref 8.6–10.2)
CHLORIDE BLD-SCNC: 102 MMOL/L (ref 98–107)
CO2: 25 MMOL/L (ref 22–29)
CREAT SERPL-MCNC: 0.9 MG/DL (ref 0.7–1.2)
GFR AFRICAN AMERICAN: >60
GFR NON-AFRICAN AMERICAN: >60 ML/MIN/1.73
GLUCOSE BLD-MCNC: 92 MG/DL (ref 74–99)
HCT VFR BLD CALC: 42.7 % (ref 37–54)
HEMOGLOBIN: 14.5 G/DL (ref 12.5–16.5)
MAGNESIUM: 2.1 MG/DL (ref 1.6–2.6)
MCH RBC QN AUTO: 34.9 PG (ref 26–35)
MCHC RBC AUTO-ENTMCNC: 34 % (ref 32–34.5)
MCV RBC AUTO: 102.6 FL (ref 80–99.9)
PDW BLD-RTO: 12.5 FL (ref 11.5–15)
PLATELET # BLD: 130 E9/L (ref 130–450)
PMV BLD AUTO: 11.4 FL (ref 7–12)
POTASSIUM REFLEX MAGNESIUM: 3.4 MMOL/L (ref 3.5–5)
RBC # BLD: 4.16 E12/L (ref 3.8–5.8)
SODIUM BLD-SCNC: 140 MMOL/L (ref 132–146)
TOTAL PROTEIN: 6.7 G/DL (ref 6.4–8.3)
WBC # BLD: 4.2 E9/L (ref 4.5–11.5)

## 2021-04-06 PROCEDURE — 6360000002 HC RX W HCPCS: Performed by: NURSE PRACTITIONER

## 2021-04-06 PROCEDURE — 80053 COMPREHEN METABOLIC PANEL: CPT

## 2021-04-06 PROCEDURE — 2580000003 HC RX 258: Performed by: EMERGENCY MEDICINE

## 2021-04-06 PROCEDURE — 85027 COMPLETE CBC AUTOMATED: CPT

## 2021-04-06 PROCEDURE — 6360000002 HC RX W HCPCS: Performed by: EMERGENCY MEDICINE

## 2021-04-06 PROCEDURE — 36415 COLL VENOUS BLD VENIPUNCTURE: CPT

## 2021-04-06 PROCEDURE — 6370000000 HC RX 637 (ALT 250 FOR IP): Performed by: INTERNAL MEDICINE

## 2021-04-06 PROCEDURE — 83735 ASSAY OF MAGNESIUM: CPT

## 2021-04-06 RX ORDER — ZIPRASIDONE MESYLATE 20 MG/ML
10 INJECTION, POWDER, LYOPHILIZED, FOR SOLUTION INTRAMUSCULAR ONCE
Status: COMPLETED | OUTPATIENT
Start: 2021-04-06 | End: 2021-04-06

## 2021-04-06 RX ORDER — PHENOBARBITAL SODIUM 65 MG/ML
30 INJECTION INTRAMUSCULAR ONCE
Status: COMPLETED | OUTPATIENT
Start: 2021-04-06 | End: 2021-04-06

## 2021-04-06 RX ORDER — PHENOBARBITAL SODIUM 65 MG/ML
30 INJECTION INTRAMUSCULAR EVERY 8 HOURS SCHEDULED
Status: DISCONTINUED | OUTPATIENT
Start: 2021-04-06 | End: 2021-04-06

## 2021-04-06 RX ADMIN — Medication 1 TABLET: at 08:58

## 2021-04-06 RX ADMIN — THIAMINE HYDROCHLORIDE 100 MG: 100 INJECTION, SOLUTION INTRAMUSCULAR; INTRAVENOUS at 08:57

## 2021-04-06 RX ADMIN — Medication 10 ML: at 08:58

## 2021-04-06 RX ADMIN — LORAZEPAM 3 MG: 2 INJECTION INTRAMUSCULAR; INTRAVENOUS at 06:50

## 2021-04-06 RX ADMIN — LORAZEPAM 2 MG: 2 INJECTION INTRAMUSCULAR; INTRAVENOUS at 08:57

## 2021-04-06 RX ADMIN — LISINOPRIL 10 MG: 10 TABLET ORAL at 08:58

## 2021-04-06 RX ADMIN — WATER 1.2 ML: 1 INJECTION INTRAMUSCULAR; INTRAVENOUS; SUBCUTANEOUS at 03:17

## 2021-04-06 RX ADMIN — PHENOBARBITAL SODIUM 30 MG: 65 INJECTION INTRAMUSCULAR at 03:09

## 2021-04-06 RX ADMIN — ZIPRASIDONE MESYLATE 10 MG: 20 INJECTION, POWDER, LYOPHILIZED, FOR SOLUTION INTRAMUSCULAR at 03:16

## 2021-04-07 NOTE — DISCHARGE SUMMARY
Xena Doll MD   4/7/2021      Thank you Diamond Messer MD for the opportunity to be involved in this patient's care. If you have any questions or concerns please feel free to contact me at [phone].

## 2021-04-13 ENCOUNTER — OFFICE VISIT (OUTPATIENT)
Dept: PRIMARY CARE CLINIC | Age: 36
End: 2021-04-13
Payer: MEDICAID

## 2021-04-13 VITALS
SYSTOLIC BLOOD PRESSURE: 136 MMHG | OXYGEN SATURATION: 100 % | BODY MASS INDEX: 25.27 KG/M2 | HEART RATE: 77 BPM | DIASTOLIC BLOOD PRESSURE: 89 MMHG | WEIGHT: 186.6 LBS | TEMPERATURE: 98.6 F | HEIGHT: 72 IN

## 2021-04-13 DIAGNOSIS — R74.8 ELEVATED LIVER ENZYMES: Primary | ICD-10-CM

## 2021-04-13 DIAGNOSIS — F10.930 ALCOHOL WITHDRAWAL SEIZURE WITHOUT COMPLICATION (HCC): ICD-10-CM

## 2021-04-13 DIAGNOSIS — R56.9 ALCOHOL WITHDRAWAL SEIZURE WITHOUT COMPLICATION (HCC): ICD-10-CM

## 2021-04-13 PROCEDURE — 4004F PT TOBACCO SCREEN RCVD TLK: CPT | Performed by: FAMILY MEDICINE

## 2021-04-13 PROCEDURE — G8427 DOCREV CUR MEDS BY ELIG CLIN: HCPCS | Performed by: FAMILY MEDICINE

## 2021-04-13 PROCEDURE — 1111F DSCHRG MED/CURRENT MED MERGE: CPT | Performed by: FAMILY MEDICINE

## 2021-04-13 PROCEDURE — 99214 OFFICE O/P EST MOD 30 MIN: CPT | Performed by: FAMILY MEDICINE

## 2021-04-13 PROCEDURE — G8419 CALC BMI OUT NRM PARAM NOF/U: HCPCS | Performed by: FAMILY MEDICINE

## 2021-04-13 RX ORDER — CHLORDIAZEPOXIDE HYDROCHLORIDE 25 MG/1
CAPSULE, GELATIN COATED ORAL
Qty: 23 CAPSULE | Refills: 0 | Status: SHIPPED | OUTPATIENT
Start: 2021-04-13 | End: 2021-04-20

## 2021-04-13 ASSESSMENT — ENCOUNTER SYMPTOMS
WHEEZING: 0
DIARRHEA: 0
COLOR CHANGE: 0
CONSTIPATION: 0
VOMITING: 0
PHOTOPHOBIA: 0
COUGH: 0
ABDOMINAL PAIN: 0
BLOOD IN STOOL: 0
SHORTNESS OF BREATH: 0
EYE REDNESS: 0
CHEST TIGHTNESS: 0

## 2021-04-13 ASSESSMENT — PATIENT HEALTH QUESTIONNAIRE - PHQ9
2. FEELING DOWN, DEPRESSED OR HOPELESS: 0
SUM OF ALL RESPONSES TO PHQ QUESTIONS 1-9: 0
SUM OF ALL RESPONSES TO PHQ QUESTIONS 1-9: 0

## 2021-04-13 NOTE — PROGRESS NOTES
Post-Discharge Transitional Care Management Services or Hospital Follow Up      Yehuda Nix   YOB: 1985    Date of Office Visit:  4/13/2021  Date of Hospital Admission: 4/4/21  Date of Hospital Discharge: 4/6/21  Readmission Risk Score(high >=14%. Medium >=10%):Readmission Risk Score: 8      Care management risk score Rising risk (score 2-5) and Complex Care (Scores >=6): 1     Non face to face  following discharge, date last encounter closed (first attempt may have been earlier): *No documented post hospital discharge outreach found in the last 14 days *No documented post hospital discharge outreach found in the last 14 days    Call initiated 2 business days of discharge: *No response recorded in the last 14 days     Patient Active Problem List   Diagnosis    Proximal phalanx fracture of finger    Laceration of finger of right hand    Elevated liver enzymes    Alcohol withdrawal seizure without complication ([facility] [address]       No Known Allergies    Medications listed as ordered at the time of discharge from hospital   Rosa Lock FLAVIA   Home Medication Instructions RAFA:    Printed on:04/13/21 1031   Medication Information                      chlordiazePOXIDE (LIBRIUM) 25 MG capsule  Take 2 capsules by mouth every 6 hours for 1 day, THEN 2 capsules every 8 hours for 1 day, THEN 1 capsule every 8 hours for 1 day, THEN 1 capsule 2 times daily for 2 days, THEN 1 capsule daily for 2 days.              lisinopril (PRINIVIL;ZESTRIL) 10 MG tablet  Take 10 mg by mouth daily                   Medications marked \"taking\" at this time  Outpatient Medications Marked as Taking for the 4/13/21 encounter (Office Visit) with Magalys Knight MD   Medication Sig Dispense Refill    chlordiazePOXIDE (LIBRIUM) 25 MG capsule Take 2 capsules by mouth every 6 hours for 1 day, THEN 2 capsules every 8 hours for 1 day, THEN 1 capsule every 8 hours for 1 day, THEN 1 capsule 2 times daily for 2 days, THEN 1 capsule daily for 2 days. 23 capsule 0    lisinopril (PRINIVIL;ZESTRIL) 10 MG tablet Take 10 mg by mouth daily          Medications patient taking as of now reconciled against medications ordered at time of hospital discharge: Yes    Chief Complaint   Patient presents with   [address] from Hospital     D/C 04/06/2021 discuss medication that was given in hospital       HPI    Inpatient course: Discharge summary reviewed- see chart. Admitted for acute alcohol withdrawal seizure. Had been drinking regularly for the past couple months including neglecting meals. Drinking because he had nothing else to do during pandemic. Was up to 1 handle of vodka 3-4 times per week. Mom asked him to not drink for the Easter weekend, so he stopped abruptly. Had witnessed seizure ~3 minutes at family's. Was brought to ED where he had labs and CT scans and xrays. Scans negative for acute injury. Labs significant for elevated lactic, elevated liver enzymes, elevated CK and macrocytosis. Mag OK. CIWA protocol was initiated in ED, he received 29 mg of Ativan as well as Keppra loading dose. He left AMA, so was not prescribed any medication including benzo. He was given resource packet for local treatment centers. Neurology consulted in hospital.     Interval history/Current status: Has been 10 days sober from alcohol. Has had no further seizure activity. Still feeling anxious and having UE tremors. He has returned to normal diet. He is reviewing his resource packet for alcohol treatment, but not sure which he wants to try, feels a 12 step program would not be for him. Review of Systems   Constitutional: Negative for activity change, appetite change, chills, diaphoresis, fatigue, fever and unexpected weight change. Eyes: Negative for photophobia, redness and visual disturbance. Respiratory: Negative for cough, chest tightness, shortness of breath and wheezing. Cardiovascular: Negative for chest pain, palpitations and leg swelling. Gastrointestinal: Negative for abdominal pain, blood in stool, constipation, diarrhea and vomiting. Endocrine: Negative for cold intolerance, heat intolerance, polydipsia, polyphagia and polyuria. Skin: Negative for color change, pallor and rash. Neurological: Positive for tremors, seizures and numbness. Negative for dizziness, syncope, facial asymmetry, speech difficulty, weakness, light-headedness and headaches. Psychiatric/Behavioral: Positive for sleep disturbance. Negative for agitation, behavioral problems, confusion, decreased concentration, dysphoric mood, hallucinations, self-injury and suicidal ideas. The patient is nervous/anxious. The patient is not hyperactive. All other systems reviewed and are negative. Vitals:    04/13/21 0930   BP: 136/89   Pulse: 77   Temp: 98.6 °F (37 °C)   SpO2: 100%   Weight: 186 lb 9.6 oz (84.6 kg)   Height: 6' (1.829 m)     Body mass index is 25.31 kg/m². Wt Readings from Last 3 Encounters:   04/13/21 186 lb 9.6 oz (84.6 kg)   04/04/21 180 lb (81.6 kg)   12/23/20 180 lb (81.6 kg)     BP Readings from Last 3 Encounters:   04/13/21 136/89   04/06/21 139/77   12/23/20 126/82       Physical Exam  Vitals signs and nursing note reviewed. Constitutional:       General: He is not in acute distress. Appearance: Normal appearance. He is not ill-appearing, toxic-appearing or diaphoretic. HENT:      Mouth/Throat:      Mouth: Mucous membranes are moist.      Pharynx: Oropharynx is clear. No oropharyngeal exudate or posterior oropharyngeal erythema. Eyes:      General: No scleral icterus. Extraocular Movements: Extraocular movements intact. Conjunctiva/sclera: Conjunctivae normal.      Pupils: Pupils are equal, round, and reactive to light. Neck:      Musculoskeletal: Neck supple. Cardiovascular:      Rate and Rhythm: Normal rate and regular rhythm. Heart sounds: Normal heart sounds.    Pulmonary:      Effort: Pulmonary effort is normal.

## 2021-04-21 ENCOUNTER — HOSPITAL ENCOUNTER (OUTPATIENT)
Age: 36
Discharge: HOME | End: 2021-04-21
Payer: MEDICAID

## 2021-04-21 DIAGNOSIS — F10.930 ALCOHOL WITHDRAWAL SEIZURE WITHOUT COMPLICATION (HCC): ICD-10-CM

## 2021-04-21 DIAGNOSIS — R56.9 ALCOHOL WITHDRAWAL SEIZURE WITHOUT COMPLICATION (HCC): ICD-10-CM

## 2021-04-21 LAB
ALBUMIN SERPL-MCNC: 4 G/DL (ref 3.5–5.2)
ALP BLD-CCNC: 80 U/L (ref 40–129)
ALT SERPL-CCNC: 34 U/L (ref 0–40)
ANION GAP SERPL CALCULATED.3IONS-SCNC: 11 MMOL/L (ref 7–16)
AST SERPL-CCNC: 24 U/L (ref 0–39)
BASOPHILS ABSOLUTE: 0.13 E9/L (ref 0–0.2)
BASOPHILS RELATIVE PERCENT: 1.9 % (ref 0–2)
BILIRUB SERPL-MCNC: <0.2 MG/DL (ref 0–1.2)
BUN BLDV-MCNC: 15 MG/DL (ref 6–20)
CALCIUM SERPL-MCNC: 9.1 MG/DL (ref 8.6–10.2)
CHLORIDE BLD-SCNC: 104 MMOL/L (ref 98–107)
CO2: 24 MMOL/L (ref 22–29)
CREAT SERPL-MCNC: 0.9 MG/DL (ref 0.7–1.2)
EOSINOPHILS ABSOLUTE: 0.13 E9/L (ref 0.05–0.5)
EOSINOPHILS RELATIVE PERCENT: 1.9 % (ref 0–6)
FOLATE: 16 NG/ML (ref 4.8–24.2)
GFR AFRICAN AMERICAN: >60
GFR NON-AFRICAN AMERICAN: >60 ML/MIN/1.73
GLUCOSE BLD-MCNC: 89 MG/DL (ref 74–99)
HCT VFR BLD CALC: 46.1 % (ref 37–54)
HEMOGLOBIN: 14.9 G/DL (ref 12.5–16.5)
IMMATURE GRANULOCYTES #: 0.09 E9/L
IMMATURE GRANULOCYTES %: 1.3 % (ref 0–5)
LYMPHOCYTES ABSOLUTE: 1.71 E9/L (ref 1.5–4)
LYMPHOCYTES RELATIVE PERCENT: 25.2 % (ref 20–42)
MAGNESIUM: 2 MG/DL (ref 1.6–2.6)
MCH RBC QN AUTO: 34 PG (ref 26–35)
MCHC RBC AUTO-ENTMCNC: 32.3 % (ref 32–34.5)
MCV RBC AUTO: 105.3 FL (ref 80–99.9)
MONOCYTES ABSOLUTE: 0.84 E9/L (ref 0.1–0.95)
MONOCYTES RELATIVE PERCENT: 12.4 % (ref 2–12)
NEUTROPHILS ABSOLUTE: 3.89 E9/L (ref 1.8–7.3)
NEUTROPHILS RELATIVE PERCENT: 57.3 % (ref 43–80)
PDW BLD-RTO: 12.5 FL (ref 11.5–15)
PLATELET # BLD: 466 E9/L (ref 130–450)
PMV BLD AUTO: 10.8 FL (ref 7–12)
POTASSIUM SERPL-SCNC: 3.9 MMOL/L (ref 3.5–5)
RBC # BLD: 4.38 E12/L (ref 3.8–5.8)
SODIUM BLD-SCNC: 139 MMOL/L (ref 132–146)
TOTAL PROTEIN: 6.6 G/DL (ref 6.4–8.3)
VITAMIN B-12: 501 PG/ML (ref 211–946)
WBC # BLD: 6.8 E9/L (ref 4.5–11.5)

## 2021-04-21 PROCEDURE — 82746 ASSAY OF FOLIC ACID SERUM: CPT

## 2021-04-21 PROCEDURE — 85025 COMPLETE CBC W/AUTO DIFF WBC: CPT

## 2021-04-21 PROCEDURE — 84425 ASSAY OF VITAMIN B-1: CPT

## 2021-04-21 PROCEDURE — 36415 COLL VENOUS BLD VENIPUNCTURE: CPT

## 2021-04-21 PROCEDURE — 80053 COMPREHEN METABOLIC PANEL: CPT

## 2021-04-21 PROCEDURE — 83735 ASSAY OF MAGNESIUM: CPT

## 2021-04-21 PROCEDURE — 82607 VITAMIN B-12: CPT

## 2021-04-26 LAB — VITAMIN B1 WHOLE BLOOD: 161 NMOL/L (ref 70–180)

## 2021-04-28 ENCOUNTER — IMMUNIZATION (OUTPATIENT)
Dept: PRIMARY CARE CLINIC | Age: 36
End: 2021-04-28
Payer: MEDICAID

## 2021-04-28 PROCEDURE — 0002A COVID-19, PFIZER VACCINE 30MCG/0.3ML DOSE: CPT | Performed by: NURSE PRACTITIONER

## 2021-04-28 PROCEDURE — 91300 COVID-19, PFIZER VACCINE 30MCG/0.3ML DOSE: CPT | Performed by: NURSE PRACTITIONER

## 2021-05-07 ENCOUNTER — OFFICE VISIT (OUTPATIENT)
Dept: PRIMARY CARE CLINIC | Age: 36
End: 2021-05-07
Payer: MEDICAID

## 2021-05-07 VITALS
WEIGHT: 186 LBS | SYSTOLIC BLOOD PRESSURE: 123 MMHG | TEMPERATURE: 98.2 F | HEART RATE: 86 BPM | BODY MASS INDEX: 25.19 KG/M2 | HEIGHT: 72 IN | OXYGEN SATURATION: 96 % | DIASTOLIC BLOOD PRESSURE: 80 MMHG

## 2021-05-07 DIAGNOSIS — F10.21 ALCOHOL USE DISORDER, SEVERE, IN EARLY REMISSION (HCC): ICD-10-CM

## 2021-05-07 DIAGNOSIS — E78.2 MIXED HYPERLIPIDEMIA: ICD-10-CM

## 2021-05-07 DIAGNOSIS — R74.8 ELEVATED LIVER ENZYMES: ICD-10-CM

## 2021-05-07 DIAGNOSIS — E78.2 MIXED HYPERLIPIDEMIA: Primary | ICD-10-CM

## 2021-05-07 LAB
CHOLESTEROL, TOTAL: 255 MG/DL (ref 0–199)
HDLC SERPL-MCNC: 50 MG/DL
LDL CHOLESTEROL CALCULATED: 186 MG/DL (ref 0–99)
TRIGL SERPL-MCNC: 95 MG/DL (ref 0–149)
VLDLC SERPL CALC-MCNC: 19 MG/DL

## 2021-05-07 PROCEDURE — G8419 CALC BMI OUT NRM PARAM NOF/U: HCPCS | Performed by: FAMILY MEDICINE

## 2021-05-07 PROCEDURE — G8427 DOCREV CUR MEDS BY ELIG CLIN: HCPCS | Performed by: FAMILY MEDICINE

## 2021-05-07 PROCEDURE — 99214 OFFICE O/P EST MOD 30 MIN: CPT | Performed by: FAMILY MEDICINE

## 2021-05-07 PROCEDURE — 4004F PT TOBACCO SCREEN RCVD TLK: CPT | Performed by: FAMILY MEDICINE

## 2021-05-07 RX ORDER — NAPROXEN 500 MG/1
500 TABLET ORAL 2 TIMES DAILY WITH MEALS
Qty: 30 TABLET | Refills: 0 | Status: SHIPPED
Start: 2021-05-07 | End: 2021-11-09 | Stop reason: ALTCHOICE

## 2021-05-07 RX ORDER — FAMOTIDINE 20 MG/1
20 TABLET, FILM COATED ORAL DAILY
Qty: 15 TABLET | Refills: 0 | Status: SHIPPED
Start: 2021-05-07 | End: 2021-11-09 | Stop reason: ALTCHOICE

## 2021-05-07 NOTE — PROGRESS NOTES
Any San  : 1985    Chief Complaint:     Chief Complaint   Patient presents with    Elevated Hepatic Enzymes     3 week follow up     HPI  3 week follow up from hospital visit for alcohol w/d seizure and abnormal labs. He remains abstinent from alcohol use and completed his Librium taper without any further w/d symptoms or concerns. Still looking into some type of treatment program, does not feel a 12 step is right for him. Reviewed labs with him which have improved. His lipid panel was not drawn with them and we need it to assess ASCVD risk. Past medical, surgical, family and social histories reviewed and updated today as appropriate    Health Maintenance Due   Topic Date Due    Hepatitis C screen  Never done    Varicella vaccine (1 of 2 - 2-dose childhood series) Never done    Pneumococcal 0-64 years Vaccine (1 of 2 - PPSV23) Never done    HIV screen  Never done    DTaP/Tdap/Td vaccine (1 - Tdap) Never done       Current Outpatient Medications   Medication Sig Dispense Refill    naproxen (NAPROSYN) 500 MG tablet Take 1 tablet by mouth 2 times daily (with meals) 30 tablet 0    famotidine (PEPCID) 20 MG tablet Take 1 tablet by mouth daily 15 tablet 0    lisinopril (PRINIVIL;ZESTRIL) 10 MG tablet Take 10 mg by mouth daily      rosuvastatin (CRESTOR) 10 MG tablet Take 1 tablet by mouth daily 30 tablet 3     No current facility-administered medications for this visit. No Known Allergies      REVIEW OF SYSTEMS  Review of Systems   Constitutional: Negative for chills, diaphoresis and fever. Eyes: Negative for redness and visual disturbance. Respiratory: Negative for cough, chest tightness, shortness of breath and wheezing. Cardiovascular: Negative for chest pain, palpitations and leg swelling. Gastrointestinal: Negative for abdominal pain, blood in stool, constipation, diarrhea and vomiting. Endocrine: Negative for polydipsia and polyuria.    Skin: Negative for color change, pallor and rash. Neurological: Negative for dizziness, syncope, weakness, numbness and headaches. Psychiatric/Behavioral: Negative for confusion and dysphoric mood. The patient is not nervous/anxious. All other systems reviewed and are negative. PHYSICAL EXAM  /80   Pulse 86   Temp 98.2 °F (36.8 °C)   Ht 6' (1.829 m)   Wt 186 lb (84.4 kg)   SpO2 96%   BMI 25.23 kg/m²   Physical Exam  Vitals reviewed. Constitutional:       General: He is not in acute distress. Appearance: He is well-developed. HENT:      Mouth/Throat:      Pharynx: No oropharyngeal exudate or posterior oropharyngeal erythema. Eyes:      General: No scleral icterus. Extraocular Movements: Extraocular movements intact. Conjunctiva/sclera: Conjunctivae normal.      Pupils: Pupils are equal, round, and reactive to light. Neck:      Thyroid: No thyromegaly. Vascular: No JVD. Cardiovascular:      Rate and Rhythm: Normal rate and regular rhythm. Heart sounds: Normal heart sounds. No murmur heard. No friction rub. No gallop. Pulmonary:      Effort: Pulmonary effort is normal. No respiratory distress. Breath sounds: Normal breath sounds. No wheezing or rales. Abdominal:      General: Bowel sounds are normal. There is no distension. Palpations: Abdomen is soft. Tenderness: There is no abdominal tenderness. Musculoskeletal:      Cervical back: Neck supple. Lymphadenopathy:      Cervical: No cervical adenopathy. Skin:     General: Skin is warm and dry. Coloration: Skin is not pale. Findings: No erythema or rash. Neurological:      General: No focal deficit present. Mental Status: He is alert and oriented to person, place, and time. Psychiatric:         Mood and Affect: Mood normal.         Behavior: Behavior normal.         Thought Content:  Thought content normal.         Judgment: Judgment normal.                             ASSESSMENT/PLAN: Diagnosis Orders   1. Mixed hyperlipidemia  Lipid Panel   2. Elevated liver enzymes     3. Alcohol use disorder, severe, in early remission ([facility] [address]       Recheck lipid panel  Liver enzymes returned to normal  MI on continued alcohol cessation provided  Encouraged 12 step or some equivalent to help with continued abstinence  Reviewed seriousness of his hospital presentation and that it could be life threatening    Reviewed age and gender appropriate health screening exams and vaccinations. Advisedpatient regarding importance of keeping up with recommended health maintenance and to schedule as soon as possible if overdue, as this is important in assessing for undiagnosed pathology, especially cancer. Patientverbalizes understanding and agrees. Call or go to ED immediately if symptoms worsen or persist.  No follow-ups on file. Sooner if necessary. Counseled regarding above diagnosis, including possible risks and complications,especially if left uncontrolled. Counseled regarding the possible side effects, risks, benefits and alternatives to treatment; patient and/or guardian verbalizes understanding. Advised patient to call with any newmedication issues. All questions answered.     Jak Amador MD  5/7/21

## 2021-05-18 RX ORDER — ROSUVASTATIN CALCIUM 10 MG/1
10 TABLET, COATED ORAL DAILY
Qty: 30 TABLET | Refills: 3 | Status: SHIPPED
Start: 2021-05-18 | End: 2021-08-27 | Stop reason: SDUPTHER

## 2021-06-14 PROBLEM — F10.21 ALCOHOL USE DISORDER, SEVERE, IN EARLY REMISSION (HCC): Status: ACTIVE | Noted: 2021-06-14

## 2021-06-14 PROBLEM — E78.2 MIXED HYPERLIPIDEMIA: Status: ACTIVE | Noted: 2021-06-14

## 2021-06-14 ASSESSMENT — ENCOUNTER SYMPTOMS
VOMITING: 0
BLOOD IN STOOL: 0
EYE REDNESS: 0
COLOR CHANGE: 0
ABDOMINAL PAIN: 0
SHORTNESS OF BREATH: 0
CONSTIPATION: 0
COUGH: 0
CHEST TIGHTNESS: 0
WHEEZING: 0
DIARRHEA: 0

## 2021-06-18 RX ORDER — LISINOPRIL 10 MG/1
TABLET ORAL
Qty: 90 TABLET | Refills: 1 | Status: SHIPPED
Start: 2021-06-18 | End: 2021-12-27

## 2021-08-09 ENCOUNTER — TELEPHONE (OUTPATIENT)
Dept: PRIMARY CARE CLINIC | Age: 36
End: 2021-08-09

## 2021-08-09 NOTE — TELEPHONE ENCOUNTER
Pt's mom called without pt knowledge. I was unable to release any information to her regarding pt bc she is not on hippa, which she was understanding too. Judy just wanted to voice some recent concerns she/family are having with Filemon Monae. pt reported drinking again and becoming very argumentative towards his children. Also in a recent txt message to his brother he stated that he wants to \"get a gun and go out as a hero\"    Mom is concerned and unsure what to do as of right now. Mom question if pt is bipolar or ADHD> I relayed that Dr Rolan Baptiste can f/u with this concerns at his next office however we were not able to release any information to her regarding plan of action. Advised mom that if pt becomes a serious threat she will need to call 911/police. Mom agreed.

## 2021-08-27 RX ORDER — ROSUVASTATIN CALCIUM 10 MG/1
10 TABLET, COATED ORAL DAILY
Qty: 30 TABLET | Refills: 3 | Status: SHIPPED
Start: 2021-08-27 | End: 2021-11-30 | Stop reason: SDUPTHER

## 2021-08-27 NOTE — TELEPHONE ENCOUNTER
Last Appointment   2/11/2020  Next Appointment  Visit date not found    Patient informed that all medications can take up to 72 business hours, can check with their pharmacy in the meantime if they like    Pt no showed his last appointment due to work

## 2021-09-09 ENCOUNTER — TELEPHONE (OUTPATIENT)
Dept: PRIMARY CARE CLINIC | Age: 36
End: 2021-09-09

## 2021-09-09 DIAGNOSIS — F41.1 GAD (GENERALIZED ANXIETY DISORDER): Primary | ICD-10-CM

## 2021-09-09 RX ORDER — FLUOXETINE 10 MG/1
10 CAPSULE ORAL DAILY
Qty: 30 CAPSULE | Refills: 1 | Status: SHIPPED
Start: 2021-09-09 | End: 2021-11-11

## 2021-09-09 NOTE — TELEPHONE ENCOUNTER
Requesting medication for his ROYCE and irritability which we've reviewed at several visits. Will start on Prozac 10mg daily. To call with any side effects. Schedule for 3 weeks to follow up.

## 2021-09-09 NOTE — LETTER
[address] [address]  [place], [address]  Phone: [phone]  Fax: [phone]    Danielle Castellano MD      9/9/2021     Patient: Carlton Nicole   YOB: 1985       To Whom It May Concern: It is my medical opinion that Carlton Nicole should remain out of work until Saturday, 9/11, for acute illness. It is not COVID. He may return Saturday with no restrictions. If you have any questions or concerns, please don't hesitate to call.     Sincerely,        Danielle Castellano MD, [facility] [phone]

## 2021-10-05 ENCOUNTER — OFFICE VISIT (OUTPATIENT)
Dept: PRIMARY CARE CLINIC | Age: 36
End: 2021-10-05
Payer: MEDICAID

## 2021-10-05 VITALS
WEIGHT: 181 LBS | TEMPERATURE: 98.8 F | SYSTOLIC BLOOD PRESSURE: 140 MMHG | DIASTOLIC BLOOD PRESSURE: 88 MMHG | BODY MASS INDEX: 24.52 KG/M2 | HEIGHT: 72 IN | OXYGEN SATURATION: 97 % | HEART RATE: 74 BPM

## 2021-10-05 DIAGNOSIS — L40.9 PSORIASIS: ICD-10-CM

## 2021-10-05 DIAGNOSIS — F41.1 GAD (GENERALIZED ANXIETY DISORDER): Primary | ICD-10-CM

## 2021-10-05 PROCEDURE — 4004F PT TOBACCO SCREEN RCVD TLK: CPT | Performed by: FAMILY MEDICINE

## 2021-10-05 PROCEDURE — 99214 OFFICE O/P EST MOD 30 MIN: CPT | Performed by: FAMILY MEDICINE

## 2021-10-05 PROCEDURE — G8484 FLU IMMUNIZE NO ADMIN: HCPCS | Performed by: FAMILY MEDICINE

## 2021-10-05 PROCEDURE — G8420 CALC BMI NORM PARAMETERS: HCPCS | Performed by: FAMILY MEDICINE

## 2021-10-05 PROCEDURE — G8427 DOCREV CUR MEDS BY ELIG CLIN: HCPCS | Performed by: FAMILY MEDICINE

## 2021-10-05 RX ORDER — CLOBETASOL PROPIONATE 0.5 MG/G
CREAM TOPICAL
Qty: 45 G | Refills: 2 | Status: SHIPPED
Start: 2021-10-05 | End: 2021-11-09 | Stop reason: SDUPTHER

## 2021-10-05 RX ORDER — LORATADINE 10 MG/1
10 TABLET ORAL DAILY
Qty: 30 TABLET | Refills: 2 | Status: SHIPPED
Start: 2021-10-05 | End: 2021-11-09 | Stop reason: SDUPTHER

## 2021-10-05 RX ORDER — METHYLPREDNISOLONE 4 MG/1
TABLET ORAL
Qty: 1 KIT | Refills: 0 | Status: SHIPPED | OUTPATIENT
Start: 2021-10-05 | End: 2021-10-11

## 2021-10-05 SDOH — ECONOMIC STABILITY: FOOD INSECURITY: WITHIN THE PAST 12 MONTHS, THE FOOD YOU BOUGHT JUST DIDN'T LAST AND YOU DIDN'T HAVE MONEY TO GET MORE.: SOMETIMES TRUE

## 2021-10-05 SDOH — ECONOMIC STABILITY: FOOD INSECURITY: WITHIN THE PAST 12 MONTHS, YOU WORRIED THAT YOUR FOOD WOULD RUN OUT BEFORE YOU GOT MONEY TO BUY MORE.: SOMETIMES TRUE

## 2021-10-05 SDOH — ECONOMIC STABILITY: TRANSPORTATION INSECURITY
IN THE PAST 12 MONTHS, HAS LACK OF TRANSPORTATION KEPT YOU FROM MEETINGS, WORK, OR FROM GETTING THINGS NEEDED FOR DAILY LIVING?: NO

## 2021-10-05 SDOH — ECONOMIC STABILITY: TRANSPORTATION INSECURITY
IN THE PAST 12 MONTHS, HAS THE LACK OF TRANSPORTATION KEPT YOU FROM MEDICAL APPOINTMENTS OR FROM GETTING MEDICATIONS?: NO

## 2021-10-05 ASSESSMENT — SOCIAL DETERMINANTS OF HEALTH (SDOH): HOW HARD IS IT FOR YOU TO PAY FOR THE VERY BASICS LIKE FOOD, HOUSING, MEDICAL CARE, AND HEATING?: SOMEWHAT HARD

## 2021-10-05 ASSESSMENT — LIFESTYLE VARIABLES: HOW OFTEN DO YOU HAVE A DRINK CONTAINING ALCOHOL: MONTHLY OR LESS

## 2021-10-05 NOTE — PROGRESS NOTES
Ana Harris  : 1985    Chief Complaint:     Chief Complaint   Patient presents with    Discuss Medications     follow up after starting prozac    Rash     itchy rash on face, knees and elbows about 10 days ago     HPI  Follow up from Prozac start. Started about 1 month ago. Feels improvement in his anxiety and ruminating thoughts. Not sure yet on the mood. No improvement yet in the sleep. No side effects thus far. Rash for about 10 days. Face, hands , elbows legs. Unsure of a contact. Pruritic. No drainage or weeping. Scaly. He does have hx Psoriasis. Past medical, surgical, family and social histories reviewed and updated today as appropriate    Health Maintenance Due   Topic Date Due    Hepatitis C screen  Never done    Varicella vaccine (1 of 2 - 2-dose childhood series) Never done    Pneumococcal 0-64 years Vaccine (1 of 2 - PPSV23) Never done    HIV screen  Never done    DTaP/Tdap/Td vaccine (1 - Tdap) Never done       Current Outpatient Medications   Medication Sig Dispense Refill    loratadine (CLARITIN) 10 MG tablet Take 1 tablet by mouth daily 30 tablet 2    clobetasol (TEMOVATE) 0.05 % cream Apply to the affected area twice per day 45 g 2    FLUoxetine (PROZAC) 10 MG capsule Take 1 capsule by mouth daily 30 capsule 1    rosuvastatin (CRESTOR) 10 MG tablet Take 1 tablet by mouth daily 30 tablet 3    lisinopril (PRINIVIL;ZESTRIL) 10 MG tablet take 1 tablet by mouth once daily 90 tablet 1    QUEtiapine (SEROQUEL) 25 MG tablet Take 1 tablet by mouth nightly For 1 week, then increase to 50mg nightly. Please notify patient when ready 60 tablet 1    naproxen (NAPROSYN) 500 MG tablet Take 1 tablet by mouth 2 times daily (with meals) 30 tablet 0    famotidine (PEPCID) 20 MG tablet Take 1 tablet by mouth daily 15 tablet 0     No current facility-administered medications for this visit.        No Known Allergies      REVIEW OF SYSTEMS  Review of Systems   Constitutional: Negative chronic nature and likely will need to keep up w topical as it comes and goes. Continue Prozac. Suspect mood disorder, advised to monitor the \"ups and downs\" and the anxiety. If worsening or not improving, especially with the SSRI, to call for addition of mood stabilizer. Seroquel would work well for his significant anxiety and insomnia as well hopefully. Reviewed age and gender appropriate health screening exams and vaccinations. Advised patient regarding importance of keeping up with recommended health maintenance and to schedule as soon as possible if overdue, as this is important in assessing for undiagnosed pathology, especially cancer. Patient verbalizes understanding and agrees. Call or go to ED immediately if symptoms worsen or persist.  No follow-ups on file. Sooner if necessary. Counseled regarding above diagnosis, including possible risks and complications,especially if left uncontrolled. Counseled regarding the possible side effects, risks, benefits and alternatives to treatment; patient and/or guardian verbalizes understanding. Advised patient to call with any newmedication issues. All questions answered.     Elaina Goodman MD  10/5/21

## 2021-10-29 ENCOUNTER — TELEPHONE (OUTPATIENT)
Dept: PRIMARY CARE CLINIC | Age: 36
End: 2021-10-29

## 2021-10-29 RX ORDER — QUETIAPINE FUMARATE 25 MG/1
25 TABLET, FILM COATED ORAL NIGHTLY
Qty: 60 TABLET | Refills: 1 | Status: SHIPPED
Start: 2021-10-29 | End: 2021-10-29 | Stop reason: SDUPTHER

## 2021-10-29 RX ORDER — QUETIAPINE FUMARATE 25 MG/1
25 TABLET, FILM COATED ORAL NIGHTLY
Qty: 60 TABLET | Refills: 1 | Status: SHIPPED | OUTPATIENT
Start: 2021-10-29

## 2021-10-29 NOTE — TELEPHONE ENCOUNTER
Sebas Gonzalez called and wanted Dr. Elaine Koo to know he is \"about to break again\" and that he would like to try the mood stabilizers that have been recommended.    Please advise

## 2021-10-30 NOTE — TELEPHONE ENCOUNTER
Sent seroquel to start nightly. Will hopefully work well as it does well targeting mood stabilization, anxiety, and sleep. Start 25mg nightly and give a couple days. Can increase to 50mg as soon as he needs. Let me know later this week how hes feeling.

## 2021-11-02 PROBLEM — L40.9 PSORIASIS: Status: ACTIVE | Noted: 2021-11-02

## 2021-11-02 ASSESSMENT — ENCOUNTER SYMPTOMS
WHEEZING: 0
COLOR CHANGE: 0
COUGH: 0
ABDOMINAL PAIN: 0
VOMITING: 0
CHEST TIGHTNESS: 0
CONSTIPATION: 0
DIARRHEA: 0
BLOOD IN STOOL: 0
SHORTNESS OF BREATH: 0

## 2021-11-09 ENCOUNTER — OFFICE VISIT (OUTPATIENT)
Dept: PRIMARY CARE CLINIC | Age: 36
End: 2021-11-09
Payer: MEDICAID

## 2021-11-09 VITALS
HEART RATE: 89 BPM | TEMPERATURE: 97.6 F | DIASTOLIC BLOOD PRESSURE: 84 MMHG | SYSTOLIC BLOOD PRESSURE: 131 MMHG | WEIGHT: 181 LBS | OXYGEN SATURATION: 98 % | BODY MASS INDEX: 24.52 KG/M2 | HEIGHT: 72 IN

## 2021-11-09 DIAGNOSIS — F10.21 ALCOHOL USE DISORDER, SEVERE, IN EARLY REMISSION (HCC): ICD-10-CM

## 2021-11-09 DIAGNOSIS — F41.1 GAD (GENERALIZED ANXIETY DISORDER): ICD-10-CM

## 2021-11-09 DIAGNOSIS — L40.9 PSORIASIS: ICD-10-CM

## 2021-11-09 DIAGNOSIS — F39 MOOD DISORDER (HCC): Primary | ICD-10-CM

## 2021-11-09 PROBLEM — Z87.898 HISTORY OF SEIZURE: Status: ACTIVE | Noted: 2021-04-13

## 2021-11-09 PROCEDURE — G8484 FLU IMMUNIZE NO ADMIN: HCPCS | Performed by: FAMILY MEDICINE

## 2021-11-09 PROCEDURE — G8427 DOCREV CUR MEDS BY ELIG CLIN: HCPCS | Performed by: FAMILY MEDICINE

## 2021-11-09 PROCEDURE — 4004F PT TOBACCO SCREEN RCVD TLK: CPT | Performed by: FAMILY MEDICINE

## 2021-11-09 PROCEDURE — 99214 OFFICE O/P EST MOD 30 MIN: CPT | Performed by: FAMILY MEDICINE

## 2021-11-09 PROCEDURE — G8420 CALC BMI NORM PARAMETERS: HCPCS | Performed by: FAMILY MEDICINE

## 2021-11-09 RX ORDER — CLOBETASOL PROPIONATE 0.5 MG/G
CREAM TOPICAL
Qty: 45 G | Refills: 2 | Status: SHIPPED | OUTPATIENT
Start: 2021-11-09

## 2021-11-09 RX ORDER — LORATADINE 10 MG/1
10 TABLET ORAL DAILY
Qty: 90 TABLET | Refills: 1 | Status: SHIPPED | OUTPATIENT
Start: 2021-11-09

## 2021-11-09 NOTE — PROGRESS NOTES
Alexis Earleon  : 1985    Chief Complaint:     Chief Complaint   Patient presents with    Anxiety     1 mo f/u     HPI  Mood still not great. Was taking Seroquel in morning, but has moved to nightly now due to fatigue. Due to increase to 50mg nightly today. Taking his Prozac 10mg daily still in the AM. Feels his irritability and snapping has reduced. No significant s/e from Seroquel. Has started drinking again on  only. Limiting to 2 drinks. Still not interested in counseling or AA. Past medical, surgical, family and social histories reviewed and updated today as appropriate    Health Maintenance Due   Topic Date Due    Hepatitis C screen  Never done    Varicella vaccine (1 of 2 - 2-dose childhood series) Never done    Pneumococcal 0-64 years Vaccine (1 of 2 - PPSV23) Never done    HIV screen  Never done    DTaP/Tdap/Td vaccine (1 - Tdap) Never done    COVID-19 Vaccine (3 - Booster for Pfizer series) 10/28/2021       Current Outpatient Medications   Medication Sig Dispense Refill    loratadine (CLARITIN) 10 MG tablet Take 1 tablet by mouth daily (Patient not taking: Reported on 2021) 90 tablet 1    clobetasol (TEMOVATE) 0.05 % cream Apply to the affected area twice per day 45 g 2    QUEtiapine (SEROQUEL) 25 MG tablet Take 1 tablet by mouth nightly For 1 week, then increase to 50mg nightly. Please notify patient when ready (Patient not taking: Reported on 2021) 60 tablet 1    lisinopril (PRINIVIL;ZESTRIL) 10 MG tablet take 1 tablet by mouth once daily 90 tablet 1    Multiple Vitamin (MULTIVITAMIN ADULT PO) Take by mouth      rosuvastatin (CRESTOR) 10 MG tablet Take 1 tablet by mouth daily 30 tablet 3    ARIPiprazole (ABILIFY) 5 MG tablet Take 1 tablet by mouth daily 30 tablet 3    FLUoxetine (PROZAC) 10 MG capsule take 1 capsule by mouth once daily 30 capsule 1     No current facility-administered medications for this visit.        No Known Allergies      REVIEW

## 2021-11-11 ENCOUNTER — TELEPHONE (OUTPATIENT)
Dept: PRIMARY CARE CLINIC | Age: 36
End: 2021-11-11

## 2021-11-11 RX ORDER — FLUOXETINE 10 MG/1
CAPSULE ORAL
Qty: 30 CAPSULE | Refills: 1 | Status: SHIPPED
Start: 2021-11-11 | End: 2022-01-13

## 2021-11-30 ENCOUNTER — OFFICE VISIT (OUTPATIENT)
Dept: PRIMARY CARE CLINIC | Age: 36
End: 2021-11-30
Payer: MEDICAID

## 2021-11-30 DIAGNOSIS — F39 MOOD DISORDER (HCC): Primary | ICD-10-CM

## 2021-11-30 DIAGNOSIS — F41.1 GAD (GENERALIZED ANXIETY DISORDER): ICD-10-CM

## 2021-11-30 PROCEDURE — 99213 OFFICE O/P EST LOW 20 MIN: CPT | Performed by: FAMILY MEDICINE

## 2021-11-30 PROCEDURE — G8484 FLU IMMUNIZE NO ADMIN: HCPCS | Performed by: FAMILY MEDICINE

## 2021-11-30 PROCEDURE — G8427 DOCREV CUR MEDS BY ELIG CLIN: HCPCS | Performed by: FAMILY MEDICINE

## 2021-11-30 PROCEDURE — 4004F PT TOBACCO SCREEN RCVD TLK: CPT | Performed by: FAMILY MEDICINE

## 2021-11-30 PROCEDURE — G8420 CALC BMI NORM PARAMETERS: HCPCS | Performed by: FAMILY MEDICINE

## 2021-11-30 RX ORDER — ROSUVASTATIN CALCIUM 10 MG/1
10 TABLET, COATED ORAL DAILY
Qty: 30 TABLET | Refills: 3 | Status: SHIPPED
Start: 2021-11-30 | End: 2022-04-01 | Stop reason: SDUPTHER

## 2021-11-30 RX ORDER — ARIPIPRAZOLE 5 MG/1
5 TABLET ORAL DAILY
Qty: 30 TABLET | Refills: 3 | Status: SHIPPED | OUTPATIENT
Start: 2021-11-30

## 2021-11-30 NOTE — LETTER
[place] [address]  [place], [address]  Phone: [phone]  Fax: [phone]    Justin Jang MD      11/30/2021     Patient: Mary Akins   YOB: 1985       To Whom It May Concern:    Please excuse Mary Akins for any recent or future missed, currently under care and adjusting medications for mood and anxiety. If you have any questions or concerns, please don't hesitate to call.     Sincerely,        Justin Jang MD, [place] [phone]

## 2021-11-30 NOTE — PROGRESS NOTES
Delon Pura  : 1985    Chief Complaint:     Chief Complaint   Patient presents with    Anxiety     2 wk f/u    Discuss Medications     discuss seroquel pt stopped taking 1 wk ago     HPI  Does not like the Seroquel. Stopped taking already. Made him more wolfe and at the same time groggy. Would like to try something different. Past medical, surgical, family and social histories reviewed and updated today as appropriate    Health Maintenance Due   Topic Date Due    Hepatitis C screen  Never done    Varicella vaccine (1 of 2 - 2-dose childhood series) Never done    Pneumococcal 0-64 years Vaccine (1 of 2 - PPSV23) Never done    HIV screen  Never done    DTaP/Tdap/Td vaccine (1 - Tdap) Never done    COVID-19 Vaccine (3 - Booster for Pfizer series) 10/28/2021       Current Outpatient Medications   Medication Sig Dispense Refill    Multiple Vitamin (MULTIVITAMIN ADULT PO) Take by mouth      rosuvastatin (CRESTOR) 10 MG tablet Take 1 tablet by mouth daily 30 tablet 3    ARIPiprazole (ABILIFY) 5 MG tablet Take 1 tablet by mouth daily 30 tablet 3    FLUoxetine (PROZAC) 10 MG capsule take 1 capsule by mouth once daily 30 capsule 1    clobetasol (TEMOVATE) 0.05 % cream Apply to the affected area twice per day 45 g 2    lisinopril (PRINIVIL;ZESTRIL) 10 MG tablet take 1 tablet by mouth once daily 90 tablet 1    loratadine (CLARITIN) 10 MG tablet Take 1 tablet by mouth daily (Patient not taking: Reported on 2021) 90 tablet 1    QUEtiapine (SEROQUEL) 25 MG tablet Take 1 tablet by mouth nightly For 1 week, then increase to 50mg nightly. Please notify patient when ready (Patient not taking: Reported on 2021) 60 tablet 1     No current facility-administered medications for this visit. No Known Allergies      REVIEW OF SYSTEMS  Review of Systems   Constitutional: Negative. Psychiatric/Behavioral: Positive for dysphoric mood.  Negative for agitation, confusion, decreased concentration, hallucinations, self-injury and suicidal ideas. The patient is nervous/anxious. The patient is not hyperactive. PHYSICAL EXAM  /76   Pulse 82   Temp 98.2 °F (36.8 °C) (Oral)   Ht 6' (1.829 m)   Wt 181 lb (82.1 kg)   SpO2 97%   BMI 24.55 kg/m²   Physical Exam  Constitutional:       General: He is not in acute distress. Appearance: Normal appearance. He is not ill-appearing or diaphoretic. Neurological:      Mental Status: He is alert. Psychiatric:         Attention and Perception: Attention and perception normal.         Mood and Affect: Mood is anxious and depressed. Affect is not inappropriate. Speech: Speech normal.         Behavior: Behavior normal. Behavior is cooperative. Thought Content: Thought content normal.         ASSESSMENT/PLAN:    1. Mood disorder (HCC)  -     ARIPiprazole (ABILIFY) 5 MG tablet; Take 1 tablet by mouth daily, Disp-30 tablet, R-3Normal  2. ROYCE (generalized anxiety disorder)        Reviewed age and gender appropriate health screening exams and vaccinations. Advised patient regarding importance of keeping up with recommended health maintenance and to schedule as soon as possible if overdue, as this is important in assessing for undiagnosed pathology, especially cancer. Patient verbalizes understanding and agrees. Call or go to ED immediately if symptoms worsen or persist.  No follow-ups on file. Sooner if necessary. Counseled regarding above diagnosis, including possible risks and complications,especially if left uncontrolled. Counseled regarding the possible side effects, risks, benefits and alternatives to treatment; patient and/or guardian verbalizes understanding. Advised patient to call with any newmedication issues. All questions answered.     Janelle Essex, MD  11/30/21

## 2021-12-16 VITALS
OXYGEN SATURATION: 97 % | WEIGHT: 181 LBS | SYSTOLIC BLOOD PRESSURE: 132 MMHG | HEART RATE: 82 BPM | BODY MASS INDEX: 24.52 KG/M2 | DIASTOLIC BLOOD PRESSURE: 76 MMHG | HEIGHT: 72 IN | TEMPERATURE: 98.2 F

## 2021-12-27 RX ORDER — LISINOPRIL 10 MG/1
TABLET ORAL
Qty: 90 TABLET | Refills: 1 | Status: SHIPPED
Start: 2021-12-27 | End: 2022-06-22 | Stop reason: SDUPTHER

## 2022-01-13 RX ORDER — FLUOXETINE 10 MG/1
CAPSULE ORAL
Qty: 90 CAPSULE | Refills: 0 | Status: SHIPPED
Start: 2022-01-13 | End: 2022-04-20

## 2022-04-01 RX ORDER — ROSUVASTATIN CALCIUM 10 MG/1
10 TABLET, COATED ORAL DAILY
Qty: 30 TABLET | Refills: 5 | Status: SHIPPED
Start: 2022-04-01 | End: 2022-06-22 | Stop reason: SDUPTHER

## 2022-04-20 RX ORDER — FLUOXETINE 10 MG/1
CAPSULE ORAL
Qty: 90 CAPSULE | Refills: 1 | Status: SHIPPED
Start: 2022-04-20 | End: 2022-10-10

## 2022-06-22 ENCOUNTER — TELEPHONE (OUTPATIENT)
Dept: PRIMARY CARE CLINIC | Age: 37
End: 2022-06-22

## 2022-06-22 RX ORDER — ROSUVASTATIN CALCIUM 10 MG/1
10 TABLET, COATED ORAL DAILY
Qty: 90 TABLET | Refills: 1 | Status: SHIPPED | OUTPATIENT
Start: 2022-06-22

## 2022-06-22 RX ORDER — LISINOPRIL 10 MG/1
TABLET ORAL
Qty: 90 TABLET | Refills: 1 | Status: SHIPPED | OUTPATIENT
Start: 2022-06-22

## 2022-06-22 NOTE — TELEPHONE ENCOUNTER
Pt called into [facility], needs refill for LISINOPHRIL AND CRESTOR Medications . Has no Lisinophril remaining, took last 1 today. Has 5 days of Crestor dosages. [facility] .

## 2022-10-10 ENCOUNTER — OFFICE VISIT (OUTPATIENT)
Dept: PRIMARY CARE CLINIC | Age: 37
End: 2022-10-10
Payer: MEDICAID

## 2022-10-10 ENCOUNTER — TELEPHONE (OUTPATIENT)
Dept: PRIMARY CARE CLINIC | Age: 37
End: 2022-10-10

## 2022-10-10 VITALS
SYSTOLIC BLOOD PRESSURE: 168 MMHG | HEART RATE: 111 BPM | OXYGEN SATURATION: 98 % | BODY MASS INDEX: 26.41 KG/M2 | TEMPERATURE: 98.9 F | WEIGHT: 195 LBS | RESPIRATION RATE: 18 BRPM | DIASTOLIC BLOOD PRESSURE: 98 MMHG | HEIGHT: 72 IN

## 2022-10-10 DIAGNOSIS — F39 MOOD DISORDER (HCC): ICD-10-CM

## 2022-10-10 DIAGNOSIS — Z76.0 ENCOUNTER FOR MEDICATION REFILL: ICD-10-CM

## 2022-10-10 DIAGNOSIS — F39 MOOD DISORDER (HCC): Primary | ICD-10-CM

## 2022-10-10 PROCEDURE — 1036F TOBACCO NON-USER: CPT | Performed by: NURSE PRACTITIONER

## 2022-10-10 PROCEDURE — G8484 FLU IMMUNIZE NO ADMIN: HCPCS | Performed by: NURSE PRACTITIONER

## 2022-10-10 PROCEDURE — 99213 OFFICE O/P EST LOW 20 MIN: CPT | Performed by: NURSE PRACTITIONER

## 2022-10-10 PROCEDURE — G8419 CALC BMI OUT NRM PARAM NOF/U: HCPCS | Performed by: NURSE PRACTITIONER

## 2022-10-10 PROCEDURE — G8427 DOCREV CUR MEDS BY ELIG CLIN: HCPCS | Performed by: NURSE PRACTITIONER

## 2022-10-10 RX ORDER — FLUOXETINE 10 MG/1
10 CAPSULE ORAL DAILY
Qty: 30 CAPSULE | Refills: 0 | Status: SHIPPED
Start: 2022-10-10 | End: 2022-10-12 | Stop reason: SDUPTHER

## 2022-10-10 NOTE — LETTER
[facility] [place]  [address] [place] [address]  Phone: [phone]  Fax: [phone]    JEREMIAH Orozco CNP        October 10, 2022     Patient: Mechelle Martinez   YOB: 1985   Date of Visit: 10/10/2022       To Whom It May Concern: It is my medical opinion that Rose Hong may return to work on 10/11/2022. If you have any questions or concerns, please don't hesitate to call.     Sincerely,        JEREMIAH Orozco CNP

## 2022-10-10 NOTE — PROGRESS NOTES
Chief Complaint:   Withdrawal (Patient has been off the prozac for about 5 days and started having diarrhea a couple days ago)    History of Present Illness   Source of history provided by: patient. Alejandra Wakefield is a 44 y.o. old male presenting to the walk in clinic requesting medication(s) as result of:                                Running out of Medication(s):  yes. Lost Medication(s):  No.                             Lost Prescription(s):  No.                             Medication(s) Stolen:  No.     Pt has been out of medications for Prozac for 5 days. States they have had diarrhea as a result of not having medications. PCP is Dr. Jennifer Griffin. Review of Systems    Unless otherwise stated in this report or unable to obtain because of the patient's clinical or mental status as evidenced by the medical record, this patients's positive and negative responses for Review of Systems, constitutional, psych, eyes, ENT, cardiovascular, respiratory, gastrointestinal, neurological, genitourinary, musculoskeletal, integument systems and systems related to the presenting problem are either stated in the preceding or were not pertinent or were negative for the symptoms and/or complaints related to the medical problem. Past Medical History:  has a past medical history of Alcohol use disorder, severe, in early remission ([address] Boxer's metacarpal fracture, neck, closed, ROYCE (generalized anxiety disorder), Mixed hyperlipidemia, and New onset seizure ([address] Past Surgical History:  has no past surgical history on file. Social History:  reports that he quit smoking about 5 weeks ago. His smoking use included cigarettes. He smoked an average of .25 packs per day. He has never used smokeless tobacco. He reports current alcohol use. He reports that he does not use drugs. Family History: family history includes Coronary Art Dis in his maternal uncle.    Allergies: Patient has no known allergies. Physical Exam   Vital Signs:  BP (!) 168/98   Pulse (!) 111   Temp 98.9 °F (37.2 °C) (Temporal)   Resp 18   Ht 6' (1.829 m)   Wt 195 lb (88.5 kg)   SpO2 98%   BMI 26.45 kg/m²    Oxygen Saturation Interpretation: Normal.    Constitutional:  Alert, development consistent with age. HEENT:  NC/NT. Airway patent. External ears normal.  Neck:  Normal ROM. Supple. Lungs:  Clear to auscultation and breath sounds equal.    CV: tachycardia with regular rhythm, normal heart sounds, without pathological murmurs, ectopy, gallops, or rubs. Abdomen:  Soft, nontender, good bowel sounds. No firm or pulsatile mass. Skin:  No rashes, erythema present. Lymphatics: No lymphangitis or adenopathy noted. Neurological:  Alert and oriented. Motor functions intact. Anxious pacing around room. Test Results Section   (All laboratory and radiology results have been personally reviewed by myself)  Labs:  No results found for this visit on 10/10/22. Imaging: All Radiology results interpreted by Radiologist unless otherwise noted. Assessment / Plan   Impression(s):  Rudy Duncan was seen today for withdrawal.    Diagnoses and all orders for this visit:    [facility]  -     FLUoxetine (PROZAC) 10 MG capsule; Take 1 capsule by mouth daily    Encounter for medication refill  -     FLUoxetine (PROZAC) 10 MG capsule; Take 1 capsule by mouth daily    Patient was provided with a 30-day courtesy refill in office today. Advised to schedule follow-up appointment with PCP for additional refills and further management of chronic conditions. ER immediately if symptoms worsen or change. Red flag symptoms discussed. Patient states understanding and is in agreement with this care plan. All questions answered. Electronically signed by JEREMIAH Martinez CNP   DD: 10/10/22    **This report was transcribed using voice recognition software.  Every effort was made to ensure accuracy; however, inadvertent computerized transcription errors may be present.

## 2022-10-10 NOTE — TELEPHONE ENCOUNTER
Pt called into [facility], needs refill on PROZAC, no dosages remaining.       .Electronically signed by Sana Guzmán on 10/10/22 at 8:33 AM EDT

## 2022-10-12 RX ORDER — FLUOXETINE 10 MG/1
10 CAPSULE ORAL DAILY
Qty: 30 CAPSULE | Refills: 3 | Status: SHIPPED | OUTPATIENT
Start: 2022-10-12

## 2022-11-08 ENCOUNTER — OFFICE VISIT (OUTPATIENT)
Dept: PRIMARY CARE CLINIC | Age: 37
End: 2022-11-08
Payer: MEDICAID

## 2022-11-08 VITALS
HEART RATE: 101 BPM | HEIGHT: 72 IN | OXYGEN SATURATION: 98 % | BODY MASS INDEX: 26.82 KG/M2 | RESPIRATION RATE: 16 BRPM | DIASTOLIC BLOOD PRESSURE: 88 MMHG | TEMPERATURE: 98.5 F | WEIGHT: 198 LBS | SYSTOLIC BLOOD PRESSURE: 132 MMHG

## 2022-11-08 DIAGNOSIS — F41.1 GAD (GENERALIZED ANXIETY DISORDER): ICD-10-CM

## 2022-11-08 DIAGNOSIS — I10 PRIMARY HYPERTENSION: ICD-10-CM

## 2022-11-08 DIAGNOSIS — L40.9 PSORIASIS: ICD-10-CM

## 2022-11-08 DIAGNOSIS — F10.21 ALCOHOL USE DISORDER, SEVERE, IN EARLY REMISSION (HCC): ICD-10-CM

## 2022-11-08 DIAGNOSIS — E78.2 MIXED HYPERLIPIDEMIA: Primary | ICD-10-CM

## 2022-11-08 DIAGNOSIS — F39 MOOD DISORDER (HCC): ICD-10-CM

## 2022-11-08 PROCEDURE — 99214 OFFICE O/P EST MOD 30 MIN: CPT | Performed by: FAMILY MEDICINE

## 2022-11-08 PROCEDURE — 3074F SYST BP LT 130 MM HG: CPT | Performed by: FAMILY MEDICINE

## 2022-11-08 PROCEDURE — G8419 CALC BMI OUT NRM PARAM NOF/U: HCPCS | Performed by: FAMILY MEDICINE

## 2022-11-08 PROCEDURE — G8484 FLU IMMUNIZE NO ADMIN: HCPCS | Performed by: FAMILY MEDICINE

## 2022-11-08 PROCEDURE — 1036F TOBACCO NON-USER: CPT | Performed by: FAMILY MEDICINE

## 2022-11-08 PROCEDURE — G8427 DOCREV CUR MEDS BY ELIG CLIN: HCPCS | Performed by: FAMILY MEDICINE

## 2022-11-08 PROCEDURE — 3078F DIAST BP <80 MM HG: CPT | Performed by: FAMILY MEDICINE

## 2022-11-08 RX ORDER — LORATADINE 10 MG/1
10 TABLET ORAL DAILY
Qty: 90 TABLET | Refills: 1 | Status: SHIPPED | OUTPATIENT
Start: 2022-11-08

## 2022-11-08 RX ORDER — LISINOPRIL 10 MG/1
TABLET ORAL
Qty: 90 TABLET | Refills: 1 | Status: SHIPPED | OUTPATIENT
Start: 2022-11-08

## 2022-11-08 RX ORDER — ROSUVASTATIN CALCIUM 10 MG/1
10 TABLET, COATED ORAL DAILY
Qty: 90 TABLET | Refills: 1 | Status: SHIPPED | OUTPATIENT
Start: 2022-11-08

## 2022-11-08 SDOH — ECONOMIC STABILITY: FOOD INSECURITY: WITHIN THE PAST 12 MONTHS, YOU WORRIED THAT YOUR FOOD WOULD RUN OUT BEFORE YOU GOT MONEY TO BUY MORE.: NEVER TRUE

## 2022-11-08 SDOH — ECONOMIC STABILITY: FOOD INSECURITY: WITHIN THE PAST 12 MONTHS, THE FOOD YOU BOUGHT JUST DIDN'T LAST AND YOU DIDN'T HAVE MONEY TO GET MORE.: NEVER TRUE

## 2022-11-08 ASSESSMENT — PATIENT HEALTH QUESTIONNAIRE - PHQ9
SUM OF ALL RESPONSES TO PHQ QUESTIONS 1-9: 1
SUM OF ALL RESPONSES TO PHQ QUESTIONS 1-9: 1
SUM OF ALL RESPONSES TO PHQ9 QUESTIONS 1 & 2: 1
SUM OF ALL RESPONSES TO PHQ QUESTIONS 1-9: 1
SUM OF ALL RESPONSES TO PHQ QUESTIONS 1-9: 1
1. LITTLE INTEREST OR PLEASURE IN DOING THINGS: 0
2. FEELING DOWN, DEPRESSED OR HOPELESS: 1

## 2022-11-08 ASSESSMENT — SOCIAL DETERMINANTS OF HEALTH (SDOH): HOW HARD IS IT FOR YOU TO PAY FOR THE VERY BASICS LIKE FOOD, HOUSING, MEDICAL CARE, AND HEATING?: SOMEWHAT HARD

## 2022-11-08 NOTE — PATIENT INSTRUCTIONS
Message me on MyChart in 2-3 weeks to update on new medicine. Let me know if viral symptoms not clearing in another few days.

## 2023-01-11 DIAGNOSIS — F39 MOOD DISORDER (HCC): ICD-10-CM

## 2023-01-12 RX ORDER — CARIPRAZINE 1.5 MG/1
CAPSULE, GELATIN COATED ORAL
Qty: 30 CAPSULE | Refills: 1 | Status: SHIPPED | OUTPATIENT
Start: 2023-01-12

## 2023-02-10 DIAGNOSIS — Z76.0 ENCOUNTER FOR MEDICATION REFILL: ICD-10-CM

## 2023-02-10 DIAGNOSIS — F39 MOOD DISORDER (HCC): ICD-10-CM

## 2023-02-10 RX ORDER — FLUOXETINE 10 MG/1
10 CAPSULE ORAL DAILY
Qty: 30 CAPSULE | Refills: 0 | Status: SHIPPED | OUTPATIENT
Start: 2023-02-10

## 2023-02-10 NOTE — TELEPHONE ENCOUNTER
Last Appointment:  11/8/2022  Future Appointments   Date Time Provider Idalia Castellon   2/14/2023  3:45 PM Miriam Gustafson MD [facility]

## 2023-03-13 DIAGNOSIS — F39 MOOD DISORDER (HCC): ICD-10-CM

## 2023-03-14 RX ORDER — CARIPRAZINE 1.5 MG/1
CAPSULE, GELATIN COATED ORAL
Qty: 30 CAPSULE | Refills: 1 | Status: SHIPPED | OUTPATIENT
Start: 2023-03-14

## 2023-03-16 DIAGNOSIS — F39 MOOD DISORDER (HCC): ICD-10-CM

## 2023-03-16 DIAGNOSIS — Z76.0 ENCOUNTER FOR MEDICATION REFILL: ICD-10-CM

## 2023-03-21 RX ORDER — FLUOXETINE 10 MG/1
CAPSULE ORAL
Qty: 30 CAPSULE | Refills: 1 | Status: SHIPPED | OUTPATIENT
Start: 2023-03-21

## 2023-05-22 DIAGNOSIS — Z76.0 ENCOUNTER FOR MEDICATION REFILL: ICD-10-CM

## 2023-05-22 DIAGNOSIS — F39 MOOD DISORDER (HCC): ICD-10-CM

## 2023-05-23 DIAGNOSIS — F39 MOOD DISORDER (HCC): ICD-10-CM

## 2023-05-23 RX ORDER — CARIPRAZINE 1.5 MG/1
CAPSULE, GELATIN COATED ORAL
Qty: 30 CAPSULE | Refills: 1 | Status: SHIPPED | OUTPATIENT
Start: 2023-05-23

## 2023-05-23 RX ORDER — FLUOXETINE 10 MG/1
CAPSULE ORAL
Qty: 90 CAPSULE | Refills: 1 | Status: SHIPPED | OUTPATIENT
Start: 2023-05-23

## 2023-06-17 DIAGNOSIS — E78.2 MIXED HYPERLIPIDEMIA: ICD-10-CM

## 2023-06-19 RX ORDER — ROSUVASTATIN CALCIUM 10 MG/1
TABLET, COATED ORAL
Qty: 90 TABLET | Refills: 1 | Status: SHIPPED | OUTPATIENT
Start: 2023-06-19

## 2023-06-22 DIAGNOSIS — I10 PRIMARY HYPERTENSION: ICD-10-CM

## 2023-06-22 RX ORDER — LISINOPRIL 10 MG/1
TABLET ORAL
Qty: 90 TABLET | Refills: 1 | Status: SHIPPED | OUTPATIENT
Start: 2023-06-22

## 2023-08-18 ENCOUNTER — OFFICE VISIT (OUTPATIENT)
Dept: PRIMARY CARE CLINIC | Age: 38
End: 2023-08-18
Payer: MEDICAID

## 2023-08-18 ENCOUNTER — APPOINTMENT (OUTPATIENT)
Dept: CT IMAGING | Age: 38
End: 2023-08-18
Payer: MEDICAID

## 2023-08-18 ENCOUNTER — HOSPITAL ENCOUNTER (EMERGENCY)
Age: 38
Discharge: HOME | End: 2023-08-18
Payer: MEDICAID

## 2023-08-18 VITALS
DIASTOLIC BLOOD PRESSURE: 99 MMHG | TEMPERATURE: 97.7 F | OXYGEN SATURATION: 100 % | HEART RATE: 73 BPM | SYSTOLIC BLOOD PRESSURE: 140 MMHG | RESPIRATION RATE: 18 BRPM

## 2023-08-18 VITALS
TEMPERATURE: 98.7 F | WEIGHT: 195.5 LBS | OXYGEN SATURATION: 99 % | BODY MASS INDEX: 26.48 KG/M2 | HEART RATE: 94 BPM | RESPIRATION RATE: 20 BRPM | DIASTOLIC BLOOD PRESSURE: 102 MMHG | SYSTOLIC BLOOD PRESSURE: 162 MMHG | HEIGHT: 72 IN

## 2023-08-18 DIAGNOSIS — S30.1XXA CONTUSION OF ABDOMINAL WALL, INITIAL ENCOUNTER: Primary | ICD-10-CM

## 2023-08-18 DIAGNOSIS — G25.2 FINE TREMOR: ICD-10-CM

## 2023-08-18 DIAGNOSIS — I10 HYPERTENSION, UNSPECIFIED TYPE: ICD-10-CM

## 2023-08-18 DIAGNOSIS — S20.20XA BRUISE, TRUNK, INITIAL ENCOUNTER: Primary | ICD-10-CM

## 2023-08-18 LAB
ALBUMIN SERPL-MCNC: 4.8 G/DL (ref 3.5–5.2)
ALP SERPL-CCNC: 88 U/L (ref 40–129)
ALT SERPL-CCNC: 148 U/L (ref 0–40)
ANION GAP SERPL CALCULATED.3IONS-SCNC: 21 MMOL/L (ref 7–16)
AST SERPL-CCNC: 246 U/L (ref 0–39)
BASOPHILS # BLD: 0.06 K/UL (ref 0–0.2)
BASOPHILS NFR BLD: 1 % (ref 0–2)
BILIRUB DIRECT SERPL-MCNC: 0.2 MG/DL (ref 0–0.3)
BILIRUB INDIRECT SERPL-MCNC: 0.5 MG/DL (ref 0–1)
BILIRUB SERPL-MCNC: 0.7 MG/DL (ref 0–1.2)
BILIRUB UR QL STRIP: NEGATIVE
BUN SERPL-MCNC: 11 MG/DL (ref 6–20)
CALCIUM SERPL-MCNC: 9.5 MG/DL (ref 8.6–10.2)
CHLORIDE SERPL-SCNC: 99 MMOL/L (ref 98–107)
CLARITY UR: CLEAR
CO2 SERPL-SCNC: 18 MMOL/L (ref 22–29)
COLOR UR: YELLOW
COMMENT: NORMAL
CREAT SERPL-MCNC: 0.8 MG/DL (ref 0.7–1.2)
EOSINOPHIL # BLD: 0.03 K/UL (ref 0.05–0.5)
EOSINOPHILS RELATIVE PERCENT: 1 % (ref 0–6)
ERYTHROCYTE [DISTWIDTH] IN BLOOD BY AUTOMATED COUNT: 12.6 % (ref 11.5–15)
GFR SERPL CREATININE-BSD FRML MDRD: >60 ML/MIN/1.73M2
GLUCOSE SERPL-MCNC: 128 MG/DL (ref 74–99)
GLUCOSE UR STRIP-MCNC: NEGATIVE MG/DL
HCT VFR BLD AUTO: 45.5 % (ref 37–54)
HGB BLD-MCNC: 15.2 G/DL (ref 12.5–16.5)
HGB UR QL STRIP.AUTO: NEGATIVE
IMM GRANULOCYTES # BLD AUTO: 0.03 K/UL (ref 0–0.58)
IMM GRANULOCYTES NFR BLD: 1 % (ref 0–5)
INR PPP: 1
KETONES UR STRIP-MCNC: NEGATIVE MG/DL
LACTATE BLDV-SCNC: 1.1 MMOL/L (ref 0.5–2.2)
LEUKOCYTE ESTERASE UR QL STRIP: NEGATIVE
LIPASE SERPL-CCNC: 96 U/L (ref 13–60)
LYMPHOCYTES NFR BLD: 0.67 K/UL (ref 1.5–4)
LYMPHOCYTES RELATIVE PERCENT: 11 % (ref 20–42)
MAGNESIUM SERPL-MCNC: 2 MG/DL (ref 1.6–2.6)
MCH RBC QN AUTO: 32.5 PG (ref 26–35)
MCHC RBC AUTO-ENTMCNC: 33.4 G/DL (ref 32–34.5)
MCV RBC AUTO: 97.2 FL (ref 80–99.9)
MONOCYTES NFR BLD: 0.49 K/UL (ref 0.1–0.95)
MONOCYTES NFR BLD: 8 % (ref 2–12)
NEUTROPHILS NFR BLD: 79 % (ref 43–80)
NEUTS SEG NFR BLD: 4.92 K/UL (ref 1.8–7.3)
NITRITE UR QL STRIP: NEGATIVE
PH UR STRIP: 6 [PH] (ref 5–9)
PLATELET # BLD AUTO: 168 K/UL (ref 130–450)
PMV BLD AUTO: 11.3 FL (ref 7–12)
POTASSIUM SERPL-SCNC: 3.9 MMOL/L (ref 3.5–5)
PROT SERPL-MCNC: 7.4 G/DL (ref 6.4–8.3)
PROT UR STRIP-MCNC: NEGATIVE MG/DL
PROTHROMBIN TIME: 10.7 SEC (ref 9.3–12.4)
RBC # BLD AUTO: 4.68 M/UL (ref 3.8–5.8)
SODIUM SERPL-SCNC: 138 MMOL/L (ref 132–146)
SP GR UR STRIP: 1.01 (ref 1–1.03)
UROBILINOGEN UR STRIP-ACNC: 0.2 EU/DL (ref 0–1)
WBC OTHER # BLD: 6.2 K/UL (ref 4.5–11.5)

## 2023-08-18 PROCEDURE — 3080F DIAST BP >= 90 MM HG: CPT

## 2023-08-18 PROCEDURE — 83605 ASSAY OF LACTIC ACID: CPT

## 2023-08-18 PROCEDURE — 6360000002 HC RX W HCPCS: Performed by: PHYSICIAN ASSISTANT

## 2023-08-18 PROCEDURE — 74177 CT ABD & PELVIS W/CONTRAST: CPT

## 2023-08-18 PROCEDURE — 6360000004 HC RX CONTRAST MEDICATION: Performed by: RADIOLOGY

## 2023-08-18 PROCEDURE — G8419 CALC BMI OUT NRM PARAM NOF/U: HCPCS

## 2023-08-18 PROCEDURE — 83690 ASSAY OF LIPASE: CPT

## 2023-08-18 PROCEDURE — 96374 THER/PROPH/DIAG INJ IV PUSH: CPT

## 2023-08-18 PROCEDURE — 85610 PROTHROMBIN TIME: CPT

## 2023-08-18 PROCEDURE — 85025 COMPLETE CBC W/AUTO DIFF WBC: CPT

## 2023-08-18 PROCEDURE — 83735 ASSAY OF MAGNESIUM: CPT

## 2023-08-18 PROCEDURE — 99285 EMERGENCY DEPT VISIT HI MDM: CPT

## 2023-08-18 PROCEDURE — 3077F SYST BP >= 140 MM HG: CPT

## 2023-08-18 PROCEDURE — 99214 OFFICE O/P EST MOD 30 MIN: CPT

## 2023-08-18 PROCEDURE — 81003 URINALYSIS AUTO W/O SCOPE: CPT

## 2023-08-18 PROCEDURE — G8427 DOCREV CUR MEDS BY ELIG CLIN: HCPCS

## 2023-08-18 PROCEDURE — 80053 COMPREHEN METABOLIC PANEL: CPT

## 2023-08-18 PROCEDURE — 4004F PT TOBACCO SCREEN RCVD TLK: CPT

## 2023-08-18 PROCEDURE — 82248 BILIRUBIN DIRECT: CPT

## 2023-08-18 RX ORDER — LORAZEPAM 2 MG/ML
1 INJECTION INTRAMUSCULAR ONCE
Status: COMPLETED | OUTPATIENT
Start: 2023-08-18 | End: 2023-08-18

## 2023-08-18 RX ADMIN — LORAZEPAM 1 MG: 2 INJECTION INTRAMUSCULAR; INTRAVENOUS at 18:19

## 2023-08-18 RX ADMIN — IOPAMIDOL 75 ML: 755 INJECTION, SOLUTION INTRAVENOUS at 22:34

## 2023-08-18 ASSESSMENT — ENCOUNTER SYMPTOMS
COLOR CHANGE: 1
APNEA: 0
SHORTNESS OF BREATH: 0
DIARRHEA: 0
VOMITING: 0
BLOOD IN STOOL: 0
NAUSEA: 0
CHEST TIGHTNESS: 0
COUGH: 0
ABDOMINAL PAIN: 0
BACK PAIN: 0

## 2023-08-18 NOTE — PROGRESS NOTES
Chief Complaint:   Bleeding/Bruising (States he has had a softball sized bruise on his torso x 2 weeks. States he did not have a fall or injury. States he is currently going through alcohol withdrawal. )      History of Present Illness   HPI:  Heavenly Quintero is a 43 y.o. male who presents walk-in today for busing noted to his left abdomen that started 2 weeks ago. He denies any injury or trauma to the area. He reports that he did have some pain there 2 weeks ago and then the bruising started. He admits that he has also is an alcoholic and had stopped drinking 5 days ago. He denies any nausea, vomiting, abdominal pain, hematuria, melena, fevers, or chills. He does have a hx of hypertension and did take his medication today. Prior to Visit Medications    Medication Sig Taking? Authorizing Provider   lisinopril (PRINIVIL;ZESTRIL) 10 MG tablet take 1 tablet by mouth once daily Yes Lord Soco MD   rosuvastatin (CRESTOR) 10 MG tablet take 1 tablet by mouth once daily Yes Lord Soco MD   FLUoxetine (PROZAC) 10 MG capsule take 1 capsule by mouth once daily Yes Lord Soco MD   VRAYLAR 1.5 MG capsule take 1 capsule by mouth once daily  Patient not taking: Reported on 8/18/2023  Lord Soco MD   loratadine (CLARITIN) 10 MG tablet Take 1 tablet by mouth daily  Patient not taking: Reported on 8/18/2023  Lord Soco MD   Multiple Vitamin (MULTIVITAMIN ADULT PO) Take by mouth  Patient not taking: Reported on 8/18/2023  Historical Provider, MD   clobetasol (TEMOVATE) 0.05 % cream Apply to the affected area twice per day  Patient not taking: Reported on 8/18/2023  Lord Soco MD       Review of Systems   Review of Systems   Constitutional:  Negative for activity change, appetite change, fatigue and fever. HENT:  Negative for congestion. Respiratory:  Negative for apnea, cough, chest tightness and shortness of breath. Cardiovascular:  Negative for chest pain and palpitations.

## 2023-08-18 NOTE — ED NOTES
The following labs were labeled with appropriate pt sticker and tubed to lab:     [x] Blue     [x] Lavender   [] on ice  [x] Green/yellow  [] Green/black [] on ice  [x] Grey  [x] on ice  [] Yellow  [] Red  [] Type/ Screen  [] ABG  [] VBG    [] COVID-19 swab    [] Rapid  [] PCR  [] Flu swab  [] Peds Viral Panel     [x] Urine Sample  [] Fecal Sample  [] Pelvic Cultures  [] Blood Cultures  [] X 2  [] STREP Cultures       Milind Dean RN  08/18/23 [id number]

## 2023-08-19 NOTE — DISCHARGE INSTRUCTIONS
Follow-up with primary care doctor within the next 3 to 5 days, avoid Motrin, NSAIDs and alcohol use.

## 2023-08-22 ENCOUNTER — OFFICE VISIT (OUTPATIENT)
Dept: PRIMARY CARE CLINIC | Age: 38
End: 2023-08-22
Payer: MEDICAID

## 2023-08-22 VITALS
HEIGHT: 72 IN | OXYGEN SATURATION: 98 % | HEART RATE: 86 BPM | SYSTOLIC BLOOD PRESSURE: 128 MMHG | DIASTOLIC BLOOD PRESSURE: 80 MMHG | WEIGHT: 191 LBS | TEMPERATURE: 98.7 F | RESPIRATION RATE: 16 BRPM | BODY MASS INDEX: 25.87 KG/M2

## 2023-08-22 DIAGNOSIS — I10 PRIMARY HYPERTENSION: ICD-10-CM

## 2023-08-22 DIAGNOSIS — F39 MOOD DISORDER (HCC): ICD-10-CM

## 2023-08-22 DIAGNOSIS — F41.1 GAD (GENERALIZED ANXIETY DISORDER): ICD-10-CM

## 2023-08-22 DIAGNOSIS — R73.03 PREDIABETES: ICD-10-CM

## 2023-08-22 DIAGNOSIS — E78.2 MIXED HYPERLIPIDEMIA: ICD-10-CM

## 2023-08-22 DIAGNOSIS — R73.09 ELEVATED GLUCOSE: ICD-10-CM

## 2023-08-22 DIAGNOSIS — F10.21 ALCOHOL USE DISORDER, SEVERE, IN EARLY REMISSION (HCC): ICD-10-CM

## 2023-08-22 DIAGNOSIS — K76.0 FATTY LIVER: ICD-10-CM

## 2023-08-22 DIAGNOSIS — E78.2 MIXED HYPERLIPIDEMIA: Primary | ICD-10-CM

## 2023-08-22 DIAGNOSIS — R74.8 ELEVATED LIVER ENZYMES: ICD-10-CM

## 2023-08-22 LAB
CHOLESTEROL: 171 MG/DL
HBA1C MFR BLD: 5.7 % (ref 4–5.6)
HDLC SERPL-MCNC: 82 MG/DL
LDL CHOLESTEROL: 77 MG/DL
TRIGL SERPL-MCNC: 59 MG/DL
VLDLC SERPL CALC-MCNC: 12 MG/DL

## 2023-08-22 PROCEDURE — G8427 DOCREV CUR MEDS BY ELIG CLIN: HCPCS | Performed by: FAMILY MEDICINE

## 2023-08-22 PROCEDURE — 3074F SYST BP LT 130 MM HG: CPT | Performed by: FAMILY MEDICINE

## 2023-08-22 PROCEDURE — 4004F PT TOBACCO SCREEN RCVD TLK: CPT | Performed by: FAMILY MEDICINE

## 2023-08-22 PROCEDURE — 99214 OFFICE O/P EST MOD 30 MIN: CPT | Performed by: FAMILY MEDICINE

## 2023-08-22 PROCEDURE — G8419 CALC BMI OUT NRM PARAM NOF/U: HCPCS | Performed by: FAMILY MEDICINE

## 2023-08-22 PROCEDURE — 3078F DIAST BP <80 MM HG: CPT | Performed by: FAMILY MEDICINE

## 2023-08-22 RX ORDER — LISINOPRIL 20 MG/1
20 TABLET ORAL DAILY
Qty: 90 TABLET | Refills: 1 | Status: SHIPPED | OUTPATIENT
Start: 2023-08-22

## 2023-08-23 PROBLEM — R73.03 PREDIABETES: Status: ACTIVE | Noted: 2023-08-23

## 2023-11-10 DIAGNOSIS — Z76.0 ENCOUNTER FOR MEDICATION REFILL: ICD-10-CM

## 2023-11-10 DIAGNOSIS — F39 MOOD DISORDER (HCC): ICD-10-CM

## 2023-11-15 RX ORDER — FLUOXETINE 10 MG/1
CAPSULE ORAL
Qty: 90 CAPSULE | Refills: 1 | Status: SHIPPED | OUTPATIENT
Start: 2023-11-15

## 2023-12-01 DIAGNOSIS — E78.2 MIXED HYPERLIPIDEMIA: ICD-10-CM

## 2023-12-01 RX ORDER — ROSUVASTATIN CALCIUM 10 MG/1
TABLET, COATED ORAL
Qty: 90 TABLET | Refills: 1 | Status: SHIPPED | OUTPATIENT
Start: 2023-12-01

## 2023-12-06 NOTE — PROGRESS NOTES
Patient attempting to leave AMA. Call to parents and left message via voicemail. Also notified physician via [facility]. Name band;

## 2024-03-13 DIAGNOSIS — I10 PRIMARY HYPERTENSION: ICD-10-CM

## 2024-03-13 NOTE — TELEPHONE ENCOUNTER
lisinopril (PRINIVIL;ZESTRIL) 20 MG tablet     Last Appointment:  8/22/2023  No future appointments.

## 2024-03-14 RX ORDER — LISINOPRIL 20 MG/1
20 TABLET ORAL DAILY
Qty: 90 TABLET | Refills: 1 | Status: SHIPPED | OUTPATIENT
Start: 2024-03-14

## 2024-04-24 ENCOUNTER — OFFICE VISIT (OUTPATIENT)
Dept: PRIMARY CARE CLINIC | Age: 39
End: 2024-04-24
Payer: MEDICAID

## 2024-04-24 VITALS
HEIGHT: 72 IN | OXYGEN SATURATION: 100 % | DIASTOLIC BLOOD PRESSURE: 87 MMHG | BODY MASS INDEX: 25.49 KG/M2 | TEMPERATURE: 98.1 F | WEIGHT: 188.2 LBS | RESPIRATION RATE: 16 BRPM | HEART RATE: 105 BPM | SYSTOLIC BLOOD PRESSURE: 127 MMHG

## 2024-04-24 DIAGNOSIS — F10.91 ALCOHOL USE DISORDER IN REMISSION: ICD-10-CM

## 2024-04-24 DIAGNOSIS — F41.1 GAD (GENERALIZED ANXIETY DISORDER): ICD-10-CM

## 2024-04-24 DIAGNOSIS — Z76.0 ENCOUNTER FOR MEDICATION REFILL: ICD-10-CM

## 2024-04-24 DIAGNOSIS — E78.2 MIXED HYPERLIPIDEMIA: ICD-10-CM

## 2024-04-24 DIAGNOSIS — I10 PRIMARY HYPERTENSION: ICD-10-CM

## 2024-04-24 DIAGNOSIS — R73.03 PREDIABETES: Primary | ICD-10-CM

## 2024-04-24 DIAGNOSIS — F39 MOOD DISORDER (HCC): ICD-10-CM

## 2024-04-24 LAB
ALBUMIN: 4.6 G/DL (ref 3.5–5.2)
ALP BLD-CCNC: 76 U/L (ref 40–129)
ALT SERPL-CCNC: 117 U/L (ref 0–40)
ANION GAP SERPL CALCULATED.3IONS-SCNC: 19 MMOL/L (ref 7–16)
AST SERPL-CCNC: 70 U/L (ref 0–39)
BILIRUB SERPL-MCNC: 0.3 MG/DL (ref 0–1.2)
BUN BLDV-MCNC: 11 MG/DL (ref 6–20)
CALCIUM SERPL-MCNC: 8.7 MG/DL (ref 8.6–10.2)
CHLORIDE BLD-SCNC: 106 MMOL/L (ref 98–107)
CHOLESTEROL: 162 MG/DL
CO2: 21 MMOL/L (ref 22–29)
CREAT SERPL-MCNC: 0.9 MG/DL (ref 0.7–1.2)
GFR SERPL CREATININE-BSD FRML MDRD: >90 ML/MIN/1.73M2
GLUCOSE BLD-MCNC: 100 MG/DL (ref 74–99)
HBA1C MFR BLD: 5.7 % (ref 4–5.6)
HCT VFR BLD CALC: 48.1 % (ref 37–54)
HDLC SERPL-MCNC: 71 MG/DL
HEMOGLOBIN: 15.7 G/DL (ref 12.5–16.5)
LDL CHOLESTEROL: 62 MG/DL
MCH RBC QN AUTO: 33.3 PG (ref 26–35)
MCHC RBC AUTO-ENTMCNC: 32.6 G/DL (ref 32–34.5)
MCV RBC AUTO: 102.1 FL (ref 80–99.9)
PDW BLD-RTO: 14.5 % (ref 11.5–15)
PLATELET # BLD: 392 K/UL (ref 130–450)
PMV BLD AUTO: 10.5 FL (ref 7–12)
POTASSIUM SERPL-SCNC: 4.3 MMOL/L (ref 3.5–5)
RBC # BLD: 4.71 M/UL (ref 3.8–5.8)
SODIUM BLD-SCNC: 146 MMOL/L (ref 132–146)
TOTAL PROTEIN: 7.3 G/DL (ref 6.4–8.3)
TRIGL SERPL-MCNC: 146 MG/DL
VLDLC SERPL CALC-MCNC: 29 MG/DL
WBC # BLD: 6.5 K/UL (ref 4.5–11.5)

## 2024-04-24 PROCEDURE — G2211 COMPLEX E/M VISIT ADD ON: HCPCS | Performed by: FAMILY MEDICINE

## 2024-04-24 PROCEDURE — 3074F SYST BP LT 130 MM HG: CPT | Performed by: FAMILY MEDICINE

## 2024-04-24 PROCEDURE — G8427 DOCREV CUR MEDS BY ELIG CLIN: HCPCS | Performed by: FAMILY MEDICINE

## 2024-04-24 PROCEDURE — G8419 CALC BMI OUT NRM PARAM NOF/U: HCPCS | Performed by: FAMILY MEDICINE

## 2024-04-24 PROCEDURE — 99214 OFFICE O/P EST MOD 30 MIN: CPT | Performed by: FAMILY MEDICINE

## 2024-04-24 PROCEDURE — 4004F PT TOBACCO SCREEN RCVD TLK: CPT | Performed by: FAMILY MEDICINE

## 2024-04-24 PROCEDURE — 3079F DIAST BP 80-89 MM HG: CPT | Performed by: FAMILY MEDICINE

## 2024-04-24 RX ORDER — ROSUVASTATIN CALCIUM 10 MG/1
10 TABLET, COATED ORAL DAILY
Qty: 90 TABLET | Refills: 1 | Status: SHIPPED | OUTPATIENT
Start: 2024-04-24

## 2024-04-24 RX ORDER — FLUOXETINE HYDROCHLORIDE 20 MG/1
20 CAPSULE ORAL DAILY
Qty: 90 CAPSULE | Refills: 1 | Status: SHIPPED | OUTPATIENT
Start: 2024-04-24

## 2024-04-24 RX ORDER — LISINOPRIL 20 MG/1
20 TABLET ORAL DAILY
Qty: 90 TABLET | Refills: 1 | Status: SHIPPED | OUTPATIENT
Start: 2024-04-24

## 2024-04-24 SDOH — ECONOMIC STABILITY: FOOD INSECURITY: WITHIN THE PAST 12 MONTHS, THE FOOD YOU BOUGHT JUST DIDN'T LAST AND YOU DIDN'T HAVE MONEY TO GET MORE.: NEVER TRUE

## 2024-04-24 SDOH — ECONOMIC STABILITY: HOUSING INSECURITY
IN THE LAST 12 MONTHS, WAS THERE A TIME WHEN YOU DID NOT HAVE A STEADY PLACE TO SLEEP OR SLEPT IN A SHELTER (INCLUDING NOW)?: NO

## 2024-04-24 SDOH — ECONOMIC STABILITY: FOOD INSECURITY: WITHIN THE PAST 12 MONTHS, YOU WORRIED THAT YOUR FOOD WOULD RUN OUT BEFORE YOU GOT MONEY TO BUY MORE.: NEVER TRUE

## 2024-04-24 SDOH — ECONOMIC STABILITY: INCOME INSECURITY: HOW HARD IS IT FOR YOU TO PAY FOR THE VERY BASICS LIKE FOOD, HOUSING, MEDICAL CARE, AND HEATING?: NOT HARD AT ALL

## 2024-04-24 ASSESSMENT — PATIENT HEALTH QUESTIONNAIRE - PHQ9
SUM OF ALL RESPONSES TO PHQ9 QUESTIONS 1 & 2: 0
1. LITTLE INTEREST OR PLEASURE IN DOING THINGS: NOT AT ALL
SUM OF ALL RESPONSES TO PHQ QUESTIONS 1-9: 0
2. FEELING DOWN, DEPRESSED OR HOPELESS: NOT AT ALL
SUM OF ALL RESPONSES TO PHQ QUESTIONS 1-9: 0

## 2024-04-24 NOTE — PROGRESS NOTES
Shashank Rosales  : 1985    Chief Complaint:     Chief Complaint   Patient presents with    Follow-up     6 month follow up.     HPI  Has only had 2-3 drinks since last visit on special occasions. Reports being fine, not needing any support/treatment. Last drink 2 weeks ago. Mood is good he states. Anxiety under control. Does not feel he has mood disorder and denies any episodes of hypo/bret. Feels prozac is working well.  HTN controlled, taking meds as rx. Denies symptoms high bp including HA, vision changes, CP, SOB, edema, focal neuro deficits. No symptoms low bp.   Has HLD. Taking statin. No s/e. Attempting to watch diet including fatty foods. No CP, dyspnea, chest tightness, exertional symptoms, neuro deficits.  Lab Results   Component Value Date/Time    CHOL 162 2024 01:37 PM    CHOL 255 2021 12:00 PM    TRIG 146 2024 01:37 PM    HDL 71 2024 01:37 PM       Past medical, surgical, family and social histories reviewed and updated today as appropriate    Health Maintenance Due   Topic Date Due    Varicella vaccine (1 of 2 - 2-dose childhood series) Never done    Pneumococcal 0-64 years Vaccine (1 of 2 - PCV) Never done    HIV screen  Never done    Hepatitis C screen  Never done    DTaP/Tdap/Td vaccine (1 - Tdap) Never done    COVID-19 Vaccine (3 - - season) 2023       Current Outpatient Medications   Medication Sig Dispense Refill    lisinopril (PRINIVIL;ZESTRIL) 20 MG tablet Take 1 tablet by mouth daily 90 tablet 1    rosuvastatin (CRESTOR) 10 MG tablet Take 1 tablet by mouth daily 90 tablet 1    FLUoxetine (PROZAC) 20 MG capsule Take 1 capsule by mouth daily 90 capsule 1     No current facility-administered medications for this visit.       Allergies   Allergen Reactions    Lactose Intolerance (Gi)          REVIEW OF SYSTEMS  Review of Systems   Constitutional:  Negative for chills, diaphoresis and fever.   Eyes:  Negative for redness and visual disturbance.

## 2024-05-20 PROBLEM — F10.91 ALCOHOL USE DISORDER IN REMISSION: Status: ACTIVE | Noted: 2021-06-14

## 2024-05-20 PROBLEM — I10 PRIMARY HYPERTENSION: Status: ACTIVE | Noted: 2024-05-20

## 2024-05-20 ASSESSMENT — ENCOUNTER SYMPTOMS
CONSTIPATION: 0
DIARRHEA: 0
COLOR CHANGE: 0
ABDOMINAL PAIN: 0
WHEEZING: 0
VOMITING: 0
EYE REDNESS: 0
BLOOD IN STOOL: 0
COUGH: 0
SHORTNESS OF BREATH: 0
CHEST TIGHTNESS: 0

## 2024-08-27 DIAGNOSIS — I10 PRIMARY HYPERTENSION: ICD-10-CM

## 2024-08-27 RX ORDER — LISINOPRIL 20 MG/1
20 TABLET ORAL DAILY
Qty: 90 TABLET | Refills: 1 | Status: SHIPPED | OUTPATIENT
Start: 2024-08-27

## 2024-10-03 DIAGNOSIS — E78.2 MIXED HYPERLIPIDEMIA: ICD-10-CM

## 2024-10-03 RX ORDER — ROSUVASTATIN CALCIUM 10 MG/1
10 TABLET, COATED ORAL DAILY
Qty: 90 TABLET | Refills: 1 | Status: SHIPPED | OUTPATIENT
Start: 2024-10-03

## 2024-10-03 NOTE — TELEPHONE ENCOUNTER
rosuvastatin (CRESTOR) 10 MG tablet     Last Appointment:  4/24/2024  No future appointments.     Component      Latest Ref Rng 4/24/2024   Cholesterol, Total      <200 mg/dL 162    HDL Cholesterol      >40 mg/dL 71    LDL Cholesterol      <100 mg/dL 62    Triglycerides      <150 mg/dL 146    VLDL      mg/dL 29

## 2024-10-24 ENCOUNTER — APPOINTMENT (OUTPATIENT)
Dept: CT IMAGING | Age: 39
End: 2024-10-24
Payer: MEDICAID

## 2024-10-24 ENCOUNTER — HOSPITAL ENCOUNTER (EMERGENCY)
Age: 39
Discharge: HOME | End: 2024-10-24
Attending: EMERGENCY MEDICINE
Payer: MEDICAID

## 2024-10-24 VITALS
TEMPERATURE: 98.3 F | HEART RATE: 118 BPM | RESPIRATION RATE: 18 BRPM | DIASTOLIC BLOOD PRESSURE: 100 MMHG | SYSTOLIC BLOOD PRESSURE: 136 MMHG | OXYGEN SATURATION: 95 %

## 2024-10-24 DIAGNOSIS — S09.90XA CLOSED HEAD INJURY, INITIAL ENCOUNTER: Primary | ICD-10-CM

## 2024-10-24 PROCEDURE — 99284 EMERGENCY DEPT VISIT MOD MDM: CPT

## 2024-10-24 PROCEDURE — 90471 IMMUNIZATION ADMIN: CPT | Performed by: EMERGENCY MEDICINE

## 2024-10-24 PROCEDURE — 90714 TD VACC NO PRESV 7 YRS+ IM: CPT | Performed by: EMERGENCY MEDICINE

## 2024-10-24 PROCEDURE — 6360000002 HC RX W HCPCS: Performed by: EMERGENCY MEDICINE

## 2024-10-24 PROCEDURE — 6370000000 HC RX 637 (ALT 250 FOR IP): Performed by: EMERGENCY MEDICINE

## 2024-10-24 PROCEDURE — 70450 CT HEAD/BRAIN W/O DYE: CPT

## 2024-10-24 PROCEDURE — 72125 CT NECK SPINE W/O DYE: CPT

## 2024-10-24 RX ORDER — POLYETHYLENE GLYCOL 3350 17 G
2 POWDER IN PACKET (EA) ORAL
Status: DISCONTINUED | OUTPATIENT
Start: 2024-10-24 | End: 2024-10-25 | Stop reason: HOSPADM

## 2024-10-24 RX ADMIN — CLOSTRIDIUM TETANI TOXOID ANTIGEN (FORMALDEHYDE INACTIVATED) AND CORYNEBACTERIUM DIPHTHERIAE TOXOID ANTIGEN (FORMALDEHYDE INACTIVATED) 0.5 ML: 5; 2 INJECTION, SUSPENSION INTRAMUSCULAR at 20:09

## 2024-10-24 RX ADMIN — NICOTINE POLACRILEX 2 MG: 2 LOZENGE ORAL at 20:10

## 2024-10-24 ASSESSMENT — PAIN - FUNCTIONAL ASSESSMENT: PAIN_FUNCTIONAL_ASSESSMENT: NONE - DENIES PAIN

## 2024-10-24 NOTE — ED NOTES
Patient currently A&OX4, answering basic orientation questions appropriately at present. Respirations non-labored, no distress noted. Patient agitated at present. Patient denies SI, HI, or any hallucinations at present. Patient stated \"I was supposed to get some nicotine and a tetanus shot\". Informed patient I will get them for him. 1:1 Constant Observer remains at bedside.

## 2024-10-24 NOTE — ED NOTES
PT is yelling at his C/O he wants his clothing he doesn't want to go to cat scan, he's not getting in any wheelchair. He wants to leave he disagrees with the pink slipped claims its based on a 14 year old girls claims, PT states he's an asshole and he knows it and he's a stupid genius.   Pt saying he is going to leave PCA explained to Pt that this is a locked unit   C/O at bedside.

## 2024-10-24 NOTE — ED NOTES
2 bags of belongings placed in locker #28. Pts phone, keys and lighter were taken by pts mother, Judy. Per Judy pt lives at home with her with his 2 kids

## 2024-10-24 NOTE — ED NOTES
Patient was brought back to ED Section G by DESIREE Rg. Patient was brought back to Section G with a 1:1 Constant Observer. No report was received.

## 2024-10-25 NOTE — ED NOTES
Received report from DESIREE Madrigal who stated she will give ordered nicotine lozenge and tetanus shot.

## 2024-10-25 NOTE — CARE COORDINATION
Patient was placed on an involuntary hold for medical evaluation, ED PCP reported patient can be discharged with a sober ride, patient's father presented to the emergency room in order to pick patient up.  ED PCP overturned patient is involuntary hold.  Patient denies SI denies HI.  And has no mental health complaints.

## 2024-10-25 NOTE — ED NOTES
PT Verbally requested we update his mother. And inform him that he is allowed to leave facility per DR. Dupree if he has a sober ride.    PCA put patients mom on hold and is waiting for  confirmation from SW

## 2024-10-25 NOTE — ED PROVIDER NOTES
bilaterally  Psychiatric: Agitated, combative, yelling at times      DIAGNOSTIC RESULTS   LABS: Interpreted by Ani Dupree, DO    Labs Reviewed - No data to display    As interpreted by me, the above displayed labs are abnormal. All other labs obtained during this visit were within normal range or not returned as of this dictation.    RADIOLOGY:   Unless a wet read is documented in the ED course or MDM, non-plain film images such as CT, Ultrasound and MRI are read by the radiologist unless otherwise specified in the medical decision-making.  Plain radiographic images are preliminarily interpreted by this ED Provider with the findings documented in the ED Course with official radiology read to follow.    Interpretation per the Radiologist below, if available at the time of this note:    CT Head W/O Contrast   Final Result   No acute intracranial abnormality.         CT CSpine W/O Contrast   Final Result   No acute abnormality of the cervical spine.           CT CSpine W/O Contrast    Result Date: 10/24/2024  EXAMINATION: CT OF THE CERVICAL SPINE WITHOUT CONTRAST 10/24/2024 8:15 pm TECHNIQUE: CT of the cervical spine was performed without the administration of intravenous contrast. Multiplanar reformatted images are provided for review. Automated exposure control, iterative reconstruction, and/or weight based adjustment of the mA/kV was utilized to reduce the radiation dose to as low as reasonably achievable. COMPARISON: 04/04/2021 HISTORY: ORDERING SYSTEM PROVIDED HISTORY: fall TECHNOLOGIST PROVIDED HISTORY: Reason for exam:->fall Decision Support Exception - unselect if not a suspected or confirmed emergency medical condition->Emergency Medical Condition (MA) What reading provider will be dictating this exam?->CRC FINDINGS: BONES/ALIGNMENT: There is no acute fracture or traumatic malalignment. DEGENERATIVE CHANGES: No significant degenerative changes. SOFT TISSUES: There is no prevertebral soft tissue swelling.

## 2024-10-25 NOTE — ED NOTES
Patient's sober ride- his father Luis Rosales here to  patient. Father agreed to assume responsibility for patient. Discharge instructions reviewed with patient and his father, both verbalized understanding. All patient's belongings returned to patient. Patient discharged per order.

## 2024-10-25 NOTE — ED NOTES
Notified Dr. Dupree of current vitals /100 and . Patient okay for discharge per physician. Discharge instructions printed.

## 2024-11-05 ENCOUNTER — OFFICE VISIT (OUTPATIENT)
Dept: PRIMARY CARE CLINIC | Age: 39
End: 2024-11-05

## 2024-11-05 VITALS
HEIGHT: 72 IN | OXYGEN SATURATION: 100 % | DIASTOLIC BLOOD PRESSURE: 70 MMHG | RESPIRATION RATE: 16 BRPM | HEART RATE: 74 BPM | SYSTOLIC BLOOD PRESSURE: 164 MMHG | BODY MASS INDEX: 25.47 KG/M2 | WEIGHT: 188 LBS | TEMPERATURE: 97.4 F

## 2024-11-05 DIAGNOSIS — F10.20 ALCOHOL USE DISORDER, MODERATE, DEPENDENCE (HCC): Primary | ICD-10-CM

## 2024-11-05 RX ORDER — OXCARBAZEPINE 150 MG/1
150 TABLET, FILM COATED ORAL 2 TIMES DAILY
Qty: 10 TABLET | Refills: 0 | Status: SHIPPED | OUTPATIENT
Start: 2024-11-05 | End: 2024-11-10

## 2024-11-05 RX ORDER — GABAPENTIN 300 MG/1
CAPSULE ORAL
Qty: 10 CAPSULE | Refills: 0 | Status: SHIPPED
Start: 2024-11-05 | End: 2024-11-13

## 2024-11-05 RX ORDER — OXCARBAZEPINE 300 MG/1
300 TABLET, FILM COATED ORAL 2 TIMES DAILY
Qty: 60 TABLET | Refills: 1 | Status: SHIPPED | OUTPATIENT
Start: 2024-11-05

## 2024-11-05 NOTE — PROGRESS NOTES
Shashank Rosales  : 1985    Chief Complaint:     Chief Complaint   Patient presents with    Follow-Up from Hospital     ED follow up from 10/24/24.       [facility]  ED follow up for alcohol intoxication and fall/hit head with LOC.   Has increased drinking again. 1 pint vodka few times per week. Stopped about 3-4 days ago. He does have a history of withdrawal seizures.  Unsure on mental health diagnoses. Has been treated in the past for mood disorder, anxiety, depression.  Not interested in any medications that may trigger weight gain.  Agreeable now to Addiction Medicine referral.  Does not feel anxious or tremulous.     Past medical, surgical, family and social histories reviewed and updated today as appropriate    Health Maintenance Due   Topic Date Due    Pneumococcal 0-64 years Vaccine (1 of 2 - PCV) Never done    Varicella vaccine (1 of 2 - 13+ 2-dose series) Never done    HIV screen  Never done    Hepatitis C screen  Never done    Flu vaccine (1) 2024    COVID-19 Vaccine (3 - 2023-24 season) 2024    DTaP/Tdap/Td vaccine (1 - Tdap) 10/25/2024       Current Outpatient Medications   Medication Sig Dispense Refill    OXcarbazepine (TRILEPTAL) 150 MG tablet Take 1 tablet by mouth 2 times daily for 5 days , then increase to 300 mg dose 10 tablet 0    OXcarbazepine (TRILEPTAL) 300 MG tablet Take 1 tablet by mouth 2 times daily 60 tablet 1    rosuvastatin (CRESTOR) 10 MG tablet TAKE 1 TABLET BY MOUTH ONCE DAILY 90 tablet 1    lisinopril (PRINIVIL;ZESTRIL) 20 MG tablet Take 1 tablet by mouth daily 90 tablet 1    FLUoxetine (PROZAC) 20 MG capsule TAKE 1 CAPSULE BY MOUTH ONCE DAILY (Patient not taking: Reported on 10/24/2024) 90 capsule 1     No current facility-administered medications for this visit.       Allergies   Allergen Reactions    Lactose Intolerance (Gi)          REVIEW OF SYSTEMS  Review of Systems   Constitutional:  Positive for appetite change. Negative for activity change, chills,

## 2024-11-13 ENCOUNTER — HOSPITAL ENCOUNTER (OUTPATIENT)
Dept: PSYCHIATRY | Age: 39
Setting detail: THERAPIES SERIES
Discharge: HOME | End: 2024-11-13
Payer: MEDICAID

## 2024-11-13 PROCEDURE — 80305 DRUG TEST PRSMV DIR OPT OBS: CPT

## 2024-11-13 PROCEDURE — 90791 PSYCH DIAGNOSTIC EVALUATION: CPT

## 2024-11-13 ASSESSMENT — PATIENT HEALTH QUESTIONNAIRE - PHQ9
7. TROUBLE CONCENTRATING ON THINGS, SUCH AS READING THE NEWSPAPER OR WATCHING TELEVISION: NEARLY EVERY DAY
5. POOR APPETITE OR OVEREATING: MORE THAN HALF THE DAYS
2. FEELING DOWN, DEPRESSED OR HOPELESS: MORE THAN HALF THE DAYS
SUM OF ALL RESPONSES TO PHQ QUESTIONS 1-9: 20
SUM OF ALL RESPONSES TO PHQ QUESTIONS 1-9: 20
8. MOVING OR SPEAKING SO SLOWLY THAT OTHER PEOPLE COULD HAVE NOTICED. OR THE OPPOSITE, BEING SO FIGETY OR RESTLESS THAT YOU HAVE BEEN MOVING AROUND A LOT MORE THAN USUAL: NEARLY EVERY DAY
9. THOUGHTS THAT YOU WOULD BE BETTER OFF DEAD, OR OF HURTING YOURSELF: NOT AT ALL
3. TROUBLE FALLING OR STAYING ASLEEP: NEARLY EVERY DAY
SUM OF ALL RESPONSES TO PHQ QUESTIONS 1-9: 20
SUM OF ALL RESPONSES TO PHQ9 QUESTIONS 1 & 2: 5
SUM OF ALL RESPONSES TO PHQ QUESTIONS 1-9: 20
6. FEELING BAD ABOUT YOURSELF - OR THAT YOU ARE A FAILURE OR HAVE LET YOURSELF OR YOUR FAMILY DOWN: MORE THAN HALF THE DAYS
10. IF YOU CHECKED OFF ANY PROBLEMS, HOW DIFFICULT HAVE THESE PROBLEMS MADE IT FOR YOU TO DO YOUR WORK, TAKE CARE OF THINGS AT HOME, OR GET ALONG WITH OTHER PEOPLE: VERY DIFFICULT
1. LITTLE INTEREST OR PLEASURE IN DOING THINGS: NEARLY EVERY DAY
4. FEELING TIRED OR HAVING LITTLE ENERGY: MORE THAN HALF THE DAYS

## 2024-11-13 ASSESSMENT — ANXIETY QUESTIONNAIRES
IF YOU CHECKED OFF ANY PROBLEMS ON THIS QUESTIONNAIRE, HOW DIFFICULT HAVE THESE PROBLEMS MADE IT FOR YOU TO DO YOUR WORK, TAKE CARE OF THINGS AT HOME, OR GET ALONG WITH OTHER PEOPLE: EXTREMELY DIFFICULT
3. WORRYING TOO MUCH ABOUT DIFFERENT THINGS: NEARLY EVERY DAY
7. FEELING AFRAID AS IF SOMETHING AWFUL MIGHT HAPPEN: MORE THAN HALF THE DAYS
2. NOT BEING ABLE TO STOP OR CONTROL WORRYING: NEARLY EVERY DAY
6. BECOMING EASILY ANNOYED OR IRRITABLE: NEARLY EVERY DAY
5. BEING SO RESTLESS THAT IT IS HARD TO SIT STILL: NEARLY EVERY DAY
4. TROUBLE RELAXING: NEARLY EVERY DAY
GAD7 TOTAL SCORE: 20
1. FEELING NERVOUS, ANXIOUS, OR ON EDGE: NEARLY EVERY DAY

## 2024-11-13 ASSESSMENT — LIFESTYLE VARIABLES
HOW MANY STANDARD DRINKS CONTAINING ALCOHOL DO YOU HAVE ON A TYPICAL DAY: 3 OR 4
HOW OFTEN DO YOU HAVE A DRINK CONTAINING ALCOHOL: 2-3 TIMES A WEEK

## 2024-11-13 NOTE — CARE COORDINATION
DIAGNOSTIC IMPRESSIONS: Substance-Use Disorder (SUB)    Scale: DSM-V Diagnosis Code   No diagnosis                    0-1    Mild JUANY                          2-3    Moderate JUANY                 4-5 F17.200 Nicotine   Severe JUANY                      6+ F10.20 Alcohol dependence; F12.20 (Cannabis dependence   Other factors: Client is a referral from Dr. Tavares, has history of alcohol dependence no previous treatment;  long history of mental health (depression and anxiety) non-compliant with counseling receives medication management from medical Dr.  Client agreeable to treatment recommend J[facility] IOP to begin 11/18/2024           Criteria symptoms   A problematic pattern of substance use leading to clinically significant impairment or distress, as manifested by at least two of the following, occurring within a 12-month period.    [x] Taken in larger amounts or over longer time than intended.    [x] Persistent desire or unsuccessful efforts to cut down/control use.    [] Great deal of time spent obtaining , using, and recovering from effects.    [x] Craving or strong desire to use.    [] Recurrent use resulting in failure to fulfill major role obligations at work; school, or home.    [x] Continued use despite persistent or recurrent social/interpersonal problems caused or exacerbated by effects.    [] Giving up important social, occupational or recreational activities because of use.    [x] Recurrent use in situations in which it is physically hazardous.    [x] Continued use despite knowledge of persistent or recurrent physical or psychological problem likely caused/exacerbated by use.      [x] Tolerance as defined by either:  Need for increased amounts to achieve intoxication or desired effects.  Diminished effect with continued use of the same amount.    [x] Withdrawal as manifested by either:  The characteristic withdrawl symptoms  Using to relieve or avoid withdrawal symptoms   Electronically signed by Delaney

## 2024-11-13 NOTE — PLAN OF CARE
[facility]- Level of Care Placement      [x]Admissions  []Continued Stay []Discharge/Transfer / Complication in [place]       Level of Care Level 1      Outpatient Services Level 2.1   Intensive Outpatient Services(IOP) Level 2.5   Partial Hospitalization Services Level 3.1 CLINICALLY Managed Low-Intensity Residential Services Level 3.3  CLINICALLY Managed Population- Specific High- Intensity Residential Services Level 3.5  CLINICALLY Managed High Intensive Residential Services Level 3.7  MEDICALLY Monitored Intensive Inpatient Services Level 4  MEDICALLY Managed Intensive Inpatient Services   Dimension 1  Acute Intoxication and/or Withdrawal Potential [] Not experiencing significant withdrawal    [] Minimal  risk of severe  withdrawal [] Minimal  risk of severe  withdrawal    [x] Manageable  at Level 2-WM [] Moderate  risk of severe withdrawal    [] Manageable at Level 2-WM [] No withdrawal risk or minimal or stable withdrawal     [] Concurrently receiving Level -WM or Level 2-WM services [] Minimal risk of severe withdrawal    [] If withdrawal is present, manageable at Level 3.2-WM  [] Minimal risk of severe withdrawal    [] If withdrawal is present manageable at Level 3.2-WM [] High risk of withdrawal, but manageable at Level  3.7-WM and does not require  full resources of a licensed hospital [] At high risk of withdrawal and requires Level 4-WM and full resources of licensed hospital    COMMENTS:           Dimension 2   Biomedical Conditions and Complications  (BMC/C) [] None or very stable    [x] Receiving concurrent medical monitoring  [] None or not a distraction from treatment    [] Problems are manageable at Level 2.1 [] None or not sufficient to distract from treatment    [] Problems are manageable at  Level 2.5 [] None or stable    [] Receiving concurrent medical monitoring  [] None or stable    [] Receiving concurrent medical monitoring  [] None or stable    []

## 2024-11-13 NOTE — CARE COORDINATION
Biopsychosocial Assessment Note    Name: Shashank Rosales  Date: 11/13/2024  Start Time: 9:45 am  End Time:  10:45 am    [facility] to complete the biopsychosocial assessment and CSSR-S.     Mental Status Exam: Alert, cooperative, oriented x4.     Presenting Problem: Client reported drinking more than he should (3-4 x a week about a pint a vodka) and wanting to stop. He stated that he has not had a drink in 3 days as his medical Dr. had given him Neurontin for a few days  to help him wean off. In addition client stated that he has been dealing with depression and anxiety for the majority of his life as he had a traumatic childhood, as he was adopted by his current family at age 9 who did raise him in a loving family and they are supportive. He admits to using THC daily if he can find it as it \"helps him to get rid of all the negative thoughts in his head. He reports going to counseling the majority of his life but has not been compliant for several years and the last counselor he has seen he thinks was 3-4 years ago at [facility] but he was not able to communicate well with her. He has concerns about attending [facility] as he has difficulty being around people but was willing to try it.    Patient Report and Notes: Client reported drinking more than he should (3-4 x a week about a pint a vodka) and wanting to stop. He stated that he has not had a drink in 3 days as his medical Dr. had given him Neurontin for a few days  to help him wean off. In addition client stated that he has been dealing with depression and anxiety for the majority of his life as he had a traumatic childhood, as he was adopted by his current family at age 9 who did raise him in a loving family and they are supportive. He admits to using THC daily if he can find it as it \"helps him to get rid of all the negative thoughts in his head. He reports going to counseling the majority of his life but has not been compliant for

## 2024-11-18 ENCOUNTER — HOSPITAL ENCOUNTER (OUTPATIENT)
Dept: PSYCHIATRY | Age: 39
Setting detail: THERAPIES SERIES
Discharge: HOME | End: 2024-11-18
Payer: MEDICAID

## 2024-11-18 PROCEDURE — H0015 ALCOHOL AND/OR DRUG SERVICES: HCPCS

## 2024-11-18 NOTE — GROUP NOTE
Start Time: 900  AM   End Time : 10:30 AM  Number of participants:3  Type of group: Psychotherapy  Mode of intervention: Education, Support, Socialization, Exploration, Clarifying, Problem-solving, Media, Confrontation, Limit-setting, and Reality-testing  Topic: Problem Behaviors  Objective:  Explore and help client gain awareness how behavior patterns foster substance use.       Note: Client attended his first IOP session and he was introduced and oriented to the group process and he actively participated in the group session. The group focused on identifying and addressing problem behavior patterns that contribute to substance use and relapse. Client was encouraged to explore personal patterns, triggers, and habitual behaviors that may undermine their sobriety and how they can actively work to change these patterns. The client actively participated in today’s group session, engaging in both discussion and activities focused on identifying problem behavior patterns that contribute to substance use and relapse. The client shared insights into emotional dysregulation, particularly related to untreated mental health issues, and acknowledged using substances as a coping mechanism for managing overwhelming emotions. He related to other participants who discussed similar challenges, which enhanced their self-awareness regarding the connection between emotional distress and substance use.         Status after intervention:Unchanged  Participation level: Active Listener and Interactive  Participation Quality: Appropriate, Attentive, and Sharing  Speech: normal  Thought Process/Content:Logical  Mood/Affect: calm  Self report: None Reported  Response to learning: Able to verbalize current knowledge/experience, Able to verbalize/acknowledge new learning, Able to retain information, and Capable of insight  Discipline Responsible: /Counselor    [x] Check in completed and reviewed.    Intervention required.

## 2024-11-18 NOTE — GROUP NOTE
Start Time: 1045 AM   End Time : 12:00 PM  Number of participants:3  Type of group: Psychoeducation  Mode of intervention: Education, Support, Socialization, Exploration, Clarifying, Problem-solving, Media, Confrontation, Limit-setting, and Reality-testing  Topic: Negative Peer Influence   Objective:To explore and enhance the client's awareness of how negative peer influence can contribute to substance use.       Note: The client actively engaged in the group session, during which the counselor facilitated discussions on the impact of negative peer influence, particularly in relation to substance use. The counselor introduced the educational film [employer] Di[employer] as a means to illustrate the influence of peers on unhealthy behaviors. Following the viewing, the client was able to relate to the content and recognize the connection between peer pressure and substance use, as well as unhealthy relationships.          Status after intervention:Improved  Participation level: Active Listener and Interactive  Participation Quality: Appropriate, Attentive, Sharing, and Supportive  Speech: normal  Thought Process/Content:Logical  Mood/Affect: calm and congruent  Self report: None Reported  Response to learning: Able to verbalize current knowledge/experience, Able to verbalize/acknowledge new learning, Able to retain information, Capable of insight, and Able to change behavior  Discipline Responsible: /Counselor    [x] Check in completed and reviewed.    Intervention required. No  Electronically signed by NADER Brock on 11/18/2024 at 3:28 PM

## 2024-11-19 ENCOUNTER — HOSPITAL ENCOUNTER (OUTPATIENT)
Dept: PSYCHIATRY | Age: 39
Setting detail: THERAPIES SERIES
Discharge: HOME | End: 2024-11-19
Payer: MEDICAID

## 2024-11-19 ENCOUNTER — OFFICE VISIT (OUTPATIENT)
Dept: PRIMARY CARE CLINIC | Age: 39
End: 2024-11-19
Payer: MEDICAID

## 2024-11-19 VITALS
HEART RATE: 69 BPM | SYSTOLIC BLOOD PRESSURE: 124 MMHG | BODY MASS INDEX: 26.17 KG/M2 | OXYGEN SATURATION: 100 % | RESPIRATION RATE: 16 BRPM | TEMPERATURE: 97.1 F | DIASTOLIC BLOOD PRESSURE: 82 MMHG | HEIGHT: 72 IN | WEIGHT: 193.2 LBS

## 2024-11-19 DIAGNOSIS — F10.20 ALCOHOL USE DISORDER, MODERATE, DEPENDENCE (HCC): ICD-10-CM

## 2024-11-19 DIAGNOSIS — M79.645 THUMB PAIN, LEFT: Primary | ICD-10-CM

## 2024-11-19 PROCEDURE — G8484 FLU IMMUNIZE NO ADMIN: HCPCS | Performed by: FAMILY MEDICINE

## 2024-11-19 PROCEDURE — H0015 ALCOHOL AND/OR DRUG SERVICES: HCPCS

## 2024-11-19 PROCEDURE — 4004F PT TOBACCO SCREEN RCVD TLK: CPT | Performed by: FAMILY MEDICINE

## 2024-11-19 PROCEDURE — 3079F DIAST BP 80-89 MM HG: CPT | Performed by: FAMILY MEDICINE

## 2024-11-19 PROCEDURE — G8419 CALC BMI OUT NRM PARAM NOF/U: HCPCS | Performed by: FAMILY MEDICINE

## 2024-11-19 PROCEDURE — 3074F SYST BP LT 130 MM HG: CPT | Performed by: FAMILY MEDICINE

## 2024-11-19 PROCEDURE — 90792 PSYCH DIAG EVAL W/MED SRVCS: CPT | Performed by: PSYCHIATRY & NEUROLOGY

## 2024-11-19 PROCEDURE — G8427 DOCREV CUR MEDS BY ELIG CLIN: HCPCS | Performed by: FAMILY MEDICINE

## 2024-11-19 PROCEDURE — 99213 OFFICE O/P EST LOW 20 MIN: CPT | Performed by: FAMILY MEDICINE

## 2024-11-19 NOTE — H&P
PSYCHIATRIC EVALUATION      CHIEF COMPLAINT:  \"I was drinking to self-medicate.\"    HISTORY OF PRESENT ILLNESS: Shashank Rosales  is a 39 y.o. male with history of dual diagnostic treatment who presents for psychiatric evaluation at [facility] as a referral from Dr. Tavares. Patient is cooperative and provides good history. Shashank reports a multitude of prior psychiatric diagnoses and medication trials. He does not have a psychiatrist currently but Dr. Tavares has been treating him for a mood disorder and is prescribing Trileptal which patient is scheduled to increase to 300 mg bid today. Patient admits to heavy alcohol use over the last couple years to self-medicate depression and anxiety. He has had withdrawal symptoms and withdrawal seizure in the past. Patient reports last drink was 11 days ago; he was drinking a pint of diluted vodka every few days. He acknowledges current depression with symptoms of depressed mood, fatigue, appetite changes, sleep disruption, anhedonia, and feelings of helplessness and hopelessness. He denies suicidal ideations. In further review of symptoms he acknowledges periods of irritability and hyperactivity in the past alternating with longer periods of depression. He did have a traumatic upbringing but no active posttraumatic stress symptoms. Patient does endorse anxiety in the form of racing thoughts, worries, poor concentration, insomnia, inability to relax and feeling on edge.     PAST PSYCHIATRIC HISTORY: No psychiatric admissions. No suicide attempts. Prior medication trials include Prozac, Vraylar, Wellbutrin and Zoloft.     PAST MEDICAL HISTORY:       Diagnosis Date    Alcohol use disorder, severe, in early remission (HCC) 6/14/2021    Boxer's metacarpal fracture, neck, closed 2009    ROYCE (generalized anxiety disorder) 9/9/2021    Mixed hyperlipidemia 6/14/2021    New onset seizure (HCC) 4/5/2021    Primary hypertension 5/20/2024     ALLERGIES: Lactose intolerance

## 2024-11-19 NOTE — PROGRESS NOTES
Shashank ALEJANDRO Bobby  : 1985    Chief Complaint:     Chief Complaint   Patient presents with    Follow-up     2 week follow up.     HPI  Just started with Dr Ho Cummings. Has been to 2 counseling/group sessions. Meds to remain the same. Feeling a little less anxious/irritable on the trileptal. Just starting 300 mg dose now. No new concerns. No side effects yet.   Nodule on volar aspect of left thumb. Tender at times. Fluctuates in size.     Past medical, surgical, family and social histories reviewed and updated today as appropriate    Health Maintenance Due   Topic Date Due    Pneumococcal 0-64 years Vaccine (1 of 2 - PCV) Never done    Varicella vaccine (1 of 2 - 13+ 2-dose series) Never done    HIV screen  Never done    Hepatitis C screen  Never done    Flu vaccine (1) 2024    COVID-19 Vaccine (3 -  season) 2024    DTaP/Tdap/Td vaccine (1 - Tdap) 10/25/2024       Current Outpatient Medications   Medication Sig Dispense Refill    OXcarbazepine (TRILEPTAL) 300 MG tablet Take 1 tablet by mouth 2 times daily 60 tablet 1    rosuvastatin (CRESTOR) 10 MG tablet TAKE 1 TABLET BY MOUTH ONCE DAILY 90 tablet 1    lisinopril (PRINIVIL;ZESTRIL) 20 MG tablet Take 1 tablet by mouth daily 90 tablet 1    OXcarbazepine (TRILEPTAL) 150 MG tablet Take 1 tablet by mouth 2 times daily for 5 days , then increase to 300 mg dose 10 tablet 0    FLUoxetine (PROZAC) 20 MG capsule TAKE 1 CAPSULE BY MOUTH ONCE DAILY (Patient not taking: Reported on 10/24/2024) 90 capsule 1     No current facility-administered medications for this visit.       Allergies   Allergen Reactions    Lactose Intolerance (Gi)          REVIEW OF SYSTEMS  Review of Systems    PHYSICAL EXAM  /82   Pulse 69   Temp 97.1 °F (36.2 °C) (Oral)   Resp 16   Ht 1.829 m (6')   Wt 87.6 kg (193 lb 3.2 oz)   SpO2 100%   BMI 26.20 kg/m²   Physical Exam  Likely cyst vs nodule on tendon    The ASCVD Risk score (Roosevelt DK, et al., 2019)

## 2024-11-19 NOTE — GROUP NOTE
Start Time: 900 AM   End Time : 1030 PM  Number of participants:3  Type of group: Psychoeducation  Mode of intervention: Education, Support, Socialization, Exploration, Clarifying, Problem-solving, Media, Confrontation, Limit-setting, and Reality-testing  .      Note: Today's session focused on exploring the topic of resentments and how they impact emotional well-being, relationships, and recovery. The counselor began by explaining what resentment is: a feeling of anger or displeasure about something or someone that is perceived as wrong, grudge, or unfair. We discussed the importance of recognizing and addressing resentments in recovery, as unresolved feelings can hinder progress and contribute to relapse.The group was encouraged to reflect on their own resentments and how they have affected their lives, particularly in the context of addiction and interpersonal relationships. .The client noted a general awareness of their resentments but limited self-disclosure, Despite this, the client did acknowledge that they have not used substances to manage or cope with these resentments, which was a positive point of progress.  The group also discussed various ways to address and release resentments, including the practices of forgiveness (both of others and oneself) and reframing negative thoughts. The Counselor emphasized the importance of confronting resentments in a healthy way to prevent them from negatively impacting recovery.        Status after intervention:Unchanged  Participation level: Active Listener, Interactive, and Minimal  Participation Quality: Appropriate and Attentive  Speech: normal  Thought Process/Content:Logical  Mood/Affect: anxious  Self report: None Reported  Response to learning: Able to verbalize current knowledge/experience, Able to verbalize/acknowledge new learning, Able to retain information, Capable of insight, and Resistant  Discipline Responsible: /Counselor    [x] Check in

## 2024-11-21 ENCOUNTER — HOSPITAL ENCOUNTER (OUTPATIENT)
Dept: PSYCHIATRY | Age: 39
Setting detail: THERAPIES SERIES
Discharge: HOME | End: 2024-11-21
Payer: MEDICAID

## 2024-11-21 PROCEDURE — 80305 DRUG TEST PRSMV DIR OPT OBS: CPT

## 2024-11-21 PROCEDURE — H0015 ALCOHOL AND/OR DRUG SERVICES: HCPCS

## 2024-11-21 NOTE — GROUP NOTE
Group Therapy Note    Date: 11-21-24  Start Time: 10:45  End Time: 12:00  Number of Participants: 3  Support Meetings attended:0    Type of Group: Psychoeducational: co-lead with spiritual care     Topic: What are you grateful for.  Mode of intervention: self understanding, existential factors, imparting of information, catharsis, group cohesion, and altruism.    Patient's Goal:  To remain sober      Notes: Fausto noted a few things he is grateful for , his two children, food , especially one of his favorites [employer], the technology of electronics, and the anticipation of purchasing an new car. He noted he has been clean for 14 days.        Urinalysis Submitted Today? Yes    Urinalysis Results: NA    If Positive, for which substance:NA    Date of Urinalysis Results Review: NA     Urinalysis Results Signed by Client? No    Status After Intervention:  Improved    Participation Level: Active Listener and Interactive    Participation Quality: Appropriate, Attentive, and Sharing      Speech: normal      Thought Process/Content: Logical  Linear      Affective Functioning: Congruent      Mood: angry, anxious, and depressed      Level of consciousness:  Alert, Oriented x4, and Attentive      Response to Learning: Able to verbalize current knowledge/experience      Endings: None Reported    Modes of Intervention: Education, Support, Socialization, Exploration, and Clarifying    SI: none reported no intervention needed  Discipline Responsible: /Counselor      Signature:  Edgardo ALVARADO

## 2024-11-21 NOTE — GROUP NOTE
Group Therapy Note    Date: 11-  Start Time: 9:00  End Time: 10:30  Number of Participants: 3  Support Meetings attended:0    Type of Group: Psychotherapy     Topic: Daily check in, personal self disclosure, and focusing on the [place] co-lead with peer support recovery.  Mode of Intervention: instillation of hope, universality, imparting of information, altruism, existential factors, and self understanding  Patient's Goal:  To maintain sobriety      Notes: Arianna endorses anxiety 10/10, depression 8/10 and anger 5/10. He denies cravings. He noted his stressors are \"Everything\". His last use was 14 days ago.He is currently unemployed as a . 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 He did get mostly good geades in school, he has some college, but went to work to support his family. He had a chance to drink liquor yesterday, but instead he pored out the bottle. At one point in his past he was making $90,00 per year working in the [employer] industry. He noted he and his partner, the mother of their children broke up because of her daily drinking patterns. The intervention he got from the [place] [facility] was to \"Know thy self\".        Urinalysis Submitted Today? Yes    Urinalysis Results: NA    If Positive, for which substance: NA    Date of Urinalysis Results Review: NA     Urinalysis Results Signed by Client? No    Status After Intervention:  Unchanged    Participation Level: Active Listener and Interactive    Participation Quality: Appropriate, Attentive, and Sharing      Speech: normal      Thought Process/Content: Logical  Linear      Affective Functioning: Congruent      Mood: angry, anxious, and depressed      Level of consciousness:  Alert, Oriented x4, and Attentive      Response to Learning: Able to verbalize current knowledge/experience, Able to verbalize/acknowledge new learning, Able to retain information, and Capable of insight      Endings: None

## 2024-11-21 NOTE — FLOWSHEET NOTE
Spiritual Support Group Note    Number of Participants in Group:                3        Time: 10:45tAM    Goal: Relief from isolation and loneliness             Miriam Sharing             Self-understanding and gain insight              Acceptance and belonging            Recognize they are not alone                Socialization             Empowerment       Encouragement    Topic:  [x] Spiritual Wellness and Self Care                  [x] Hope                     [x] Connecting with Divine/Others        [x] Thankfulness and Gratitude               []  Meaningfulness and Purpose               [] Forgiveness               [] Peace               [x] Connect to Community      [] Other    Participation Level:   [x] Active Listener   [] Minimal   [] Monopolizing   [x] Interactive   [] No Participation   []  Other:     Attention:   [x] Alert   [] Distractible   [] Drowsy   [] Poor   [] Other:    Manner:   [x] Cooperative   [] Suspicious   [] Withdrawn   [] Guarded   [] Irritable   [] Inhospitable   [] Other:     Others Comments from Group:

## 2024-11-22 ENCOUNTER — HOSPITAL ENCOUNTER (OUTPATIENT)
Age: 39
Discharge: HOME | End: 2024-11-22
Payer: MEDICAID

## 2024-11-22 ENCOUNTER — HOSPITAL ENCOUNTER (OUTPATIENT)
Dept: GENERAL RADIOLOGY | Age: 39
End: 2024-11-22
Payer: MEDICAID

## 2024-11-22 ENCOUNTER — HOSPITAL ENCOUNTER (OUTPATIENT)
Age: 39
End: 2024-11-22
Payer: MEDICAID

## 2024-11-22 DIAGNOSIS — M79.645 THUMB PAIN, LEFT: ICD-10-CM

## 2024-11-22 PROCEDURE — 73140 X-RAY EXAM OF FINGER(S): CPT

## 2024-11-25 ENCOUNTER — HOSPITAL ENCOUNTER (OUTPATIENT)
Dept: PSYCHIATRY | Age: 39
Setting detail: THERAPIES SERIES
Discharge: HOME | End: 2024-11-25
Payer: MEDICAID

## 2024-11-25 PROCEDURE — 80305 DRUG TEST PRSMV DIR OPT OBS: CPT

## 2024-11-25 PROCEDURE — H0015 ALCOHOL AND/OR DRUG SERVICES: HCPCS

## 2024-11-25 NOTE — GROUP NOTE
Start Time: 900  AM   End Time : 1030 AM  Number of participants:3  Type of group: Psychotherapy  Mode of intervention: Education, Support, Socialization, Exploration, Clarifying, Problem-solving, Media, Confrontation, Limit-setting, and Reality-testing  Topic: Triggers/ Cravings  Objective:  Explore and help client gain awareness how substance use dependence is a disease.      Note: Client actively participated in the group session. Counselor processed and explored triggers / cravings  by Living in Balance by Humaira.The patient expressed ongoing challenges with identifying and managing triggers and cravings related to their substance use. They reported heightened awareness of specific situations and emotional states that act as triggers, particularly using peer ( Childrens mother - reported drinks alcohol )  and during times of boredom & isolation. The patient noted increased cravings during moments of emotional discomfort, particularly when feeling overwhelmed or isolated.The client has shown increased self-awareness of their emotional states and how these can influence their susceptibility to cravings and he is in the process of developing a more comprehensive understanding of their triggers and is working to refine his coping skills for better relapse prevention.          Status after intervention:Improved  Participation level: Active Listener and Interactive  Participation Quality: Appropriate, Attentive, and Sharing  Speech: normal  Thought Process/Content:Logical  Mood/Affect: calm and congruent  Self report: None Reported  Response to learning: Able to verbalize current knowledge/experience, Able to verbalize/acknowledge new learning, Able to retain information, and Capable of insight  Discipline Responsible: /Counselor    [x] Check in completed and reviewed.    Intervention required. no

## 2024-11-25 NOTE — GROUP NOTE
Start Time: 1045 AM   End Time: 12:00 PM   Number of participants:3  Type of group: Psychoeducation  Mode of intervention: Education, Support, Socialization, Exploration, Clarifying, Problem-solving, Confrontation, Limit-setting, and Reality-testing  Topic: HIV/ AIDS/ STD education  Objective:  Explore the dangerous risks associated with substance use and aid client with gaining awareness to make improved decision making skills.     Note: Client actively participated in the group session. Client was presented [place] and [place] [facility] [place] die[place] and health risk associated with substance use behaviors. Client was able to identify and verbalize and acknowledge 1-2 negative high risks associated with substance use behavior and disease risk.      Status after intervention:Improved  Participation level: Active Listener and Interactive  Participation Quality: Appropriate, Attentive, and Sharing  Speech: normal  Thought Process/Content:Logical  Mood/Affect: calm and congruent  Self report: None Reported  Response to learning: Able to verbalize current knowledge/experience, Able to verbalize/acknowledge new learning, Able to retain information, Capable of insight, Able to change behavior, and Progressing to goal  Discipline Responsible: /Counselor    [x] Check in completed and reviewed.    Intervention required. No  Electronically signed by NADER Brock on 11/25/2024 at 12:10 PM

## 2024-11-26 ENCOUNTER — HOSPITAL ENCOUNTER (OUTPATIENT)
Dept: PSYCHIATRY | Age: 39
Setting detail: THERAPIES SERIES
Discharge: HOME | End: 2024-11-26
Payer: MEDICAID

## 2024-11-26 DIAGNOSIS — F10.20 ALCOHOL DEPENDENCE, UNCOMPLICATED (HCC): Primary | ICD-10-CM

## 2024-11-26 PROCEDURE — H0015 ALCOHOL AND/OR DRUG SERVICES: HCPCS

## 2024-11-26 NOTE — GROUP NOTE
11/26/2024  Start Time: 9:00 am  End Time: 10:30 am  Number of participants:3  Type of group: Psychotherapy  Mode of intervention: Education, Support, Socialization, Exploration, Clarifying, Problem-solving, Confrontation, and Limit-setting  Topic: Coping with Emotions: Shame     Notes: Pt. actively participated in the session and did not show any verification of attending sober support group meetings since last session.  Client did request information on agnostic groups and was provided with information on Smart Recovery and given several suggestions from the .  Themes of group focused on current symptom presentation (PAWS), triggers, balance, stress, and coping with shame. A handout on 10 ways to Forgive Yourself & Let Go of the Past was presented on Coping with Emotions: Shame.  Client was able to identify and list 1-2 behavioral changes to address shame by sharing in group and exploring meetings.  Client was receptive to the discussion and able to make positive connections with others through discussing personal issues as others in the group related to having guilt over things he did that upset his children. His peers were supportive and encouraging.     Status after intervention:Unchanged  Participation level: Active Listener and Interactive  Participation Quality: Appropriate, Attentive, and Sharing  Speech: normal  Thought Process/Content:Logical  Mood/Affect: anxious  Self report: None Reported  Response to learning: Able to verbalize current knowledge/experience, Able to verbalize/acknowledge new learning, and Able to retain information  Discipline Responsible: /Counselor    [x] Check in completed and reviewed.    Intervention required. no     Electronically signed by JUDAH Lindquist on 11/26/2024 at 12:32 PM

## 2024-11-26 NOTE — GROUP NOTE
[id number]  Start Time :10:45 AM  End Time: 12:00 PM  Number of participants:3  Type of group: Recovery  Mode of intervention: Education, Support, Socialization, Exploration, Clarifying, Problem-solving, Confrontation, Limit-setting, and Reality-testing  Topic: Relapse Prevention: Dealing with the Holidays Sober  Objective:  Assist the clientt with gaining insight and awareness on the relapse prevention process and identify positive ways to deal with the holidays sober     Client actively participated in the session. Client was educated on the Relapse Prevention process getting through the Holidays sober. (2) handouts were reviewed: [place] & Tips for staying sober, preventing relapse and avoiding the holiday blues, and an AA: Dealing with the Holidays Sober were explored.  Client was assigned to identify and explore triggers, thinking, and emotional buildup and share with the group. Client able to complete assignment and then share ways to take action to address danger signs and use preventative measures and make positive plans. Client was able to verbalize and process 2-3 positive plans that he can address dealing with the upcoming holiday to remain sober and prevent relapse. Client  able to share  that he will stay focused on his cooking, being with his family and did take the suggestion to look up [facility] meetings.  Client open to feedback and able support others in the group with encouragement.  Status after intervention:Improved  Participation level: Active Listener and Interactive  Participation Quality: Appropriate, Attentive, and Sharing  Speech: normal  Thought Process/Content:Logical  Mood/Affect: brightens and anxious  Self report: None Reported  Response to learning: Able to verbalize current knowledge/experience, Able to verbalize/acknowledge new learning, and Able to retain information  Discipline Responsible: /Counselor    [x] Check in completed and reviewed.    Intervention

## 2024-11-27 ENCOUNTER — HOSPITAL ENCOUNTER (OUTPATIENT)
Dept: PSYCHIATRY | Age: 39
Setting detail: THERAPIES SERIES
Discharge: HOME | End: 2024-11-27
Payer: MEDICAID

## 2024-11-27 DIAGNOSIS — F10.20 ALCOHOL DEPENDENCE, UNCOMPLICATED (HCC): Primary | ICD-10-CM

## 2024-11-27 PROCEDURE — H0015 ALCOHOL AND/OR DRUG SERVICES: HCPCS

## 2024-11-27 NOTE — CARE COORDINATION
11/27/2024  Client stated he wanted to explore counseling options. Encouraged client to call his insurance company to see where he was covered and then we could review his options. Left message that we could do this on Monday if he needed any assistance.  Electronically signed by JUDAH Lindquist on 11/27/2024 at 1:14 PM

## 2024-11-27 NOTE — GROUP NOTE
11/27/2024  Start Time: 10:30 AM   End Time: 11:45 AM   Number of participants:3  Type of group: Wellness  Mode of intervention: Education, Support, Socialization, Exploration, Clarifying, Problem-solving, Activity, Movement, Media, Confrontation, Limit-setting, and Reality-testing  Topic: Gratitude: Learning to appreciate leisure fun in recovery        Today's session emphasis was on gratitude and the benefits of learning to utilize leisure skills in recovery. A group activity was conducted whereas participants were able to participate in an activity to increase mental and social skills and enhance a sense of well-being and enjoyment.  Client was receptive to information presented and able to interact well with others. He was able to laugh, making positive comments, and share with his peers. Client's thoughts and feelings toward working together and communicating were explored as he stated that it helped him to be more focused. Client was able to make positive connections and list several things he is grateful for on the gratitude exercise without any difficulty. Client did voice a desire to explore obtaining a counselor and he was informed that this can be done an we will review in the upcoming week..    Status after intervention:Improved  Participation level: Active Listener and Interactive  Participation Quality: Appropriate, Attentive, and Sharing  Speech: normal  Thought Process/Content:Logical  Mood/Affect: anxious and depressed  Self report: None Reported  Response to learning: Able to verbalize current knowledge/experience, Able to verbalize/acknowledge new learning, and Able to retain information  Discipline Responsible: /Counselor    [x] Check in completed and reviewed.    Intervention required. No  Electronically signed by JUDAH Lindquist on 11/27/2024 at 2:03 PM

## 2024-11-27 NOTE — GROUP NOTE
11/27/2024  Start Time: 8:45 am  End Time: 10:30 am  Number of participants:3  Type of group: Psychotherapy  Mode of intervention: Education, Support, Socialization, Exploration, Clarifying, Problem-solving, Confrontation, and Limit-setting  Topic: Taking the Next Steps & Sponsorship  Notes: Client actively participated in the session and did not show any verification of attending sober support group meetings since last session however he did state that he did review the information that was provided to him yesterday by the counselor and  concerning meetings.   Client able to check in and share  that he reviewed all the information on meetings and is planning on attending an in-person meeting over the holiday weekend. Themes of group focused on current symptom presentation (PAWS), triggers, balance, stress, sponsorship and taking next steps. A handout on Sponsorship from AA, [facility], and [facility] [employer] were all provided and discussed on how each can assist in the recovery process especially in providing suggestions to maintaining sobriety.  Client was able to identify and list 1-2 behavioral changes to take in addressing his next steps concerning sponsorship which are to review all the information he received today, attend a meeting and begin to ask questions.  Client was receptive to the feedback given by his peers and he was also engaged by asking questions. Client was more involved in the group today.    Status after intervention:Improved  Participation level: Active Listener and Interactive  Participation Quality: Appropriate, Attentive, and Sharing  Speech: normal  Thought Process/Content:Logical  Mood/Affect: anxious  Self report: None Reported  Response to learning: Able to verbalize current knowledge/experience, Able to verbalize/acknowledge new learning, and Able to retain information  Discipline Responsible: /Counselor    [x] Check in completed and reviewed.    Intervention

## 2024-12-02 ENCOUNTER — HOSPITAL ENCOUNTER (OUTPATIENT)
Dept: PSYCHIATRY | Age: 39
Setting detail: THERAPIES SERIES
Discharge: HOME | End: 2024-12-02
Payer: MEDICAID

## 2024-12-02 DIAGNOSIS — F10.20 ALCOHOL DEPENDENCE, UNCOMPLICATED (HCC): Primary | ICD-10-CM

## 2024-12-02 PROCEDURE — H0015 ALCOHOL AND/OR DRUG SERVICES: HCPCS

## 2024-12-02 PROCEDURE — 80305 DRUG TEST PRSMV DIR OPT OBS: CPT

## 2024-12-02 NOTE — GROUP NOTE
12/2/2024  Start Time: 10:45AM   End Time: 12:00 PM  Number of participants:3  Type of group: Psychotherapy  Mode of intervention: Education, Support, Socialization, Exploration, Clarifying, Problem-solving, Confrontation, Limit-setting, and Reality-testing  Topic: Explore Character Defects and Cognitive Distortions      Note; Client actively participated in the group session. Two handouts were presented: One from AA : Checklist of Flaws and Assets and Coping: Costs and Benefits from [employer].  Common cognitive distortions and character defects were reviewed to help client see how denial and negative thinking can result from substance addiction. Alternative ways to deal with such thinking was explored and discussed. Client was receptive to education and showed understanding of material presented. Counselor assisted client with identifying and exploring 2-4 character flaws: compulsive and apprehensive and reinforced ways to address them: get engaged in the program; work the process of change; and begin to explore positive ways to cope with feelings and thoughts.    Status after intervention:Improved  Participation level: Active Listener and Interactive  Participation Quality: Appropriate, Attentive, Sharing, and Supportive  Speech: normal  Thought Process/Content:Logical  Mood/Affect: anxious and depressed  Self report: None Reported  Response to learning: Able to verbalize current knowledge/experience, Able to verbalize/acknowledge new learning, and Able to retain information  Discipline Responsible: /Counselor    [x] Check in completed and reviewed.    Intervention required. No  Electronically signed by JUDAH Lindquist on 12/2/2024 at 1:11 PM      
reviewed.    Intervention required. No  Electronically signed by JUDAH Lindquist on 12/2/2024 at 12:43 PM

## 2024-12-03 ENCOUNTER — HOSPITAL ENCOUNTER (OUTPATIENT)
Dept: PSYCHIATRY | Age: 39
Setting detail: THERAPIES SERIES
Discharge: HOME | End: 2024-12-03
Payer: MEDICAID

## 2024-12-03 DIAGNOSIS — I10 PRIMARY HYPERTENSION: ICD-10-CM

## 2024-12-03 PROCEDURE — 99214 OFFICE O/P EST MOD 30 MIN: CPT | Performed by: PSYCHIATRY & NEUROLOGY

## 2024-12-03 NOTE — PROGRESS NOTES
PSYCHIATRY ATTENDING NOTE    CC: \"My mood has been better.\"    S: Patient being seen at [facility] in follow-up for alcohol use disorder, ROYCE and mood disorder. Met with patient to discuss progress with treatment.     Shashank presents in good spirits  Tolerating increase in Trileptal fine  Feels mood has improved but worried about crashing  Discussed purpose of mood stabilizer in diminishing the intensity and duration of mood episodes  Doing very well with sobriety - has been sober for 26 days now which is longest period in years  Denies alcohol cravings and mutually agreed to hold off with medication for this  Still experiencing moderate level of daily generalized anxiety  Discussed possible augmentation with low dose SSRI or buspirone  Patient would like to just keep taking only the Trileptal for now   No acute issues or concerns otherwise     MSE: Male appears age. Pleasant, cooperative, forthcoming. Normal psychomotor activity, gait, strength, tone, eye contact. Mood euthymic. Affect flexible. Speech clear. Thought process organized. Content future-oriented. No SI or HI. No paranoia, delusions, hallucinations. Orientation, concentration, recent and remote memory are grossly intact. Fund of knowledge fair. Language use fair. Insight and judgment fair.     MEDICATIONS:   Trileptal 300 mg bid (cont)     ASSESSMENT:  Alcohol Use Disorder  Mood Disorder  ROYCE  Rule-outs: cyclothymia, bipolar II, PTSD    PLAN: Continue IOP and current medications. Patient responding well to treatment interventions. Support and reassurance provided. Continue to monitor symptoms, side effects and response to medications. Adjust treatment as needed. Consider low dose Lexapro or Buspar. See back two weeks. Discuss with team.                           Electronically signed by Lico Teague MD on 12/3/2024 at 9:17 AM

## 2024-12-03 NOTE — TELEPHONE ENCOUNTER
Name of Medication(s) Requested:  Requested Prescriptions     Pending Prescriptions Disp Refills    lisinopril (PRINIVIL;ZESTRIL) 20 MG tablet 90 tablet 1     Sig: Take 1 tablet by mouth daily       Medication is on current medication list Yes    Dosage and directions were verified? Yes    Quantity verified: 30 day supply     Pharmacy Verified?  Yes    Last Appointment:  11/19/2024    Future appts:  Future Appointments   Date Time Provider Department Center   12/5/2024  9:00 AM SCHEDULE, [facility]   12/9/2024  9:00 AM SCHEDULE, [facility]   12/10/2024  9:00 AM SCHEDULE, [facility]   12/12/2024  9:00 AM SCHEDULE, [facility]   12/16/2024  9:00 AM SCHEDULE, [facility]   12/17/2024  9:00 AM SCHEDULE, [facility]   12/17/2024 11:00 AM Lico Teague MD [facility] [place]   12/19/2024  9:00 AM SCHEDULE, [facility] [place]   12/23/2024  9:00 AM SCHEDULE, [facility]   12/24/2024  9:00 AM SCHEDULE, [facility]   12/26/2024  9:00 AM SCHEDULE, [facility] [place]   12/30/2024  9:00 AM SCHEDULE, [facility] [place]   12/31/2024  9:00 AM SCHEDULE, [facility] [place]   1/2/2025  9:00 AM SCHEDULE, [facility] [place]   1/6/2025  9:00 AM SCHEDULE, [facility]   1/7/2025  9:00 AM SCHEDULE, [facility]   1/9/2025  9:00 AM SCHEDULE, [facility]   1/13/2025  9:00 AM SCHEDULE, [facility]   1/14/2025  9:00 AM SCHEDULE, [facility]   1/15/2025  9:00 AM Edgardo Tavares MD [facility]

## 2024-12-03 NOTE — FLOWSHEET NOTE
Client excused to take his daughter to a medical appointment.  Electronically signed by NADER Brock on 12/3/2024 at 12:22 PM

## 2024-12-04 RX ORDER — LISINOPRIL 20 MG/1
20 TABLET ORAL DAILY
Qty: 90 TABLET | Refills: 1 | Status: SHIPPED | OUTPATIENT
Start: 2024-12-04

## 2024-12-05 ENCOUNTER — APPOINTMENT (OUTPATIENT)
Dept: PSYCHIATRY | Age: 39
End: 2024-12-05
Payer: MEDICAID

## 2024-12-09 ENCOUNTER — HOSPITAL ENCOUNTER (OUTPATIENT)
Dept: PSYCHIATRY | Age: 39
Setting detail: THERAPIES SERIES
Discharge: HOME | End: 2024-12-09
Payer: MEDICAID

## 2024-12-09 PROCEDURE — H0015 ALCOHOL AND/OR DRUG SERVICES: HCPCS

## 2024-12-09 PROCEDURE — 80305 DRUG TEST PRSMV DIR OPT OBS: CPT

## 2024-12-09 NOTE — GROUP NOTE
Start Time: 900 AM   End Time : 1030 AM  Number of participants:4  Type of group: Psychoeducation  Mode of intervention: Education, Support, Socialization, Exploration, Clarifying, Problem-solving, Media, Confrontation, Limit-setting, and Reality-testing  Topic: Self defeating behaviors  Objective:  Explore and help client gain awareness how self-defeating behaviors harm sobriety      Note: The client actively participated in the group session but had difficulty identifying self-defeating behaviors. Instead of directly addressing the issue, he tended to speak around the topic. When the counselor asked the client to reflect on a specific behavior he needed to change in order to maintain sobriety, the client acknowledged that there were several behaviors to address but did not offer a clear or specific response. It is evident that the client needs to improve on providing clear self-disclosure when discussing the issues he must address to support his sobriety. Clearer and more focused communication on these topics would aid in his progress.          Status after intervention:Unchanged  Participation level: Active Listener, Interactive, and Minimal  Participation Quality: Attentive and Resistant  Speech: normal  Thought Process/Content:Logical  Mood/Affect: calm and suspicious  Self report: None Reported  Response to learning: Able to verbalize current knowledge/experience, Able to verbalize/acknowledge new learning, Able to retain information, and Capable of insight  Discipline Responsible: /Counselor    [x] Check in completed and reviewed.    Intervention required. No  Electronically signed by NADER Brock on 12/9/2024 at 11:21 AM

## 2024-12-09 NOTE — GROUP NOTE
Start Time: 1045 AM   End Time : 12:00 PM  Number of participants:4  Type of group: Psychoeducation  Mode of intervention: Education, Support, Socialization, Exploration, Clarifying, Problem-solving, Media, Confrontation, Limit-setting, and Reality-testing  Topic: Disease Concept of Addiction  Objective:  Explore and help client gain awareness related to the dangers of alcohol.      Note: Client actively participated in the group session. Counselor processed and the dangers of Alcohol related to alcohol abuse and dependence. Client was assigned to view \" Drugged on Alcohol \" by [employer]. Client was able to relate to the education. Client verbalized 1-2 aspects from the film of dangers associated with alcohol use and from personal experiences of abusing alcohol to manage his anxious thoughts. Counselor asked client to practice sharing more self-disclosure to aid his recovery journey and he was somewhat receptive.      Status after intervention:Improved  Participation level: Active Listener, Interactive, and Minimal  Participation Quality: Appropriate, Sharing, and Supportive  Speech: normal  Thought Process/Content:Logical  Mood/Affect: anxious and congruent  Self report: None Reported  Response to learning: Able to verbalize current knowledge/experience, Able to verbalize/acknowledge new learning, Able to retain information, Capable of insight, and Able to change behavior  Discipline Responsible: /Counselor    [x] Check in completed and reviewed.    Intervention required. No  Electronically signed by NADER Brock on 12/9/2024 at 1:50 PM

## 2024-12-10 ENCOUNTER — HOSPITAL ENCOUNTER (OUTPATIENT)
Dept: PSYCHIATRY | Age: 39
Setting detail: THERAPIES SERIES
Discharge: HOME | End: 2024-12-10
Payer: MEDICAID

## 2024-12-10 PROCEDURE — H0015 ALCOHOL AND/OR DRUG SERVICES: HCPCS

## 2024-12-10 NOTE — GROUP NOTE
Number of participants:3  Type of group: Psychotherapy  Mode of intervention: Education, Support, Socialization, Exploration, Clarifying, Problem-solving, Confrontation, Limit-setting, and Reality-testing  Topic: Letting go Surrendering     Objective: To explore client's active participation in the group therapy session, focusing on the exploration of the concepts of letting go and surrender within the context of recovery. The goal is to highlight the client's progress in overcoming substance use, specifically alcohol, and his growing awareness of how alcohol has negatively impacted his life.     The client actively participated in the group session. The counselor facilitated a discussion on the concepts of letting go and surrender within the context of recovery. The client was able to relate to these themes and shared that he has made significant progress in his recovery by letting go of using peers and alcohol. He proudly verbalized that he has now been 34 days sober from alcohol.  The client further reflected on how alcohol had hindered his life in various ways, particularly in relation to his health and his role as an active father. He acknowledged that his past relationship with alcohol was not supportive of his goals of staying healthy or being the father he aspires to be. The client demonstrated insight into the negative impact alcohol had on his life and expressed a strong commitment to maintaining his sobriety moving forward.  Intervention:  The counselor processed the client's experience of letting go of alcohol and unhealthy relationships. The discussion focused on how surrendering to the recovery process can open opportunities for personal growth and support the achievement of life goals. The counselor reinforced the client’s progress and encouraged him to continue setting boundaries to protect his sobriety and stay focused on his long-term goals.      Status after intervention:Improved  Participation

## 2024-12-12 ENCOUNTER — HOSPITAL ENCOUNTER (OUTPATIENT)
Dept: PSYCHIATRY | Age: 39
Setting detail: THERAPIES SERIES
Discharge: HOME | End: 2024-12-12
Payer: MEDICAID

## 2024-12-12 PROCEDURE — H0015 ALCOHOL AND/OR DRUG SERVICES: HCPCS

## 2024-12-12 NOTE — GROUP NOTE
Start Time: 1045 AM   End Time: 12:00 PM   Number of participants:3  Type of group: Psychoeducation  Mode of intervention: Education, Support, Socialization, Exploration, Clarifying, Media, Problem-solving, Confrontation, Limit-setting, and Reality-testing  Topic: Alcohol  Objective:  Explore the dangerous risks associated with alcohol and aid client with gaining awareness to make improved decision making skills.     Note: Client actively participated in the group session. Client was presented education on alcohol abuse and dependence by [employer]. Client was able to identify and verbalize and acknowledge 2-4 negative high risks associated with alcohol abuse behavior and health risks.      Status after intervention:Improved  Participation level: Active Listener and Interactive  Participation Quality: Appropriate, Attentive, and Sharing  Speech: normal  Thought Process/Content:Logical  Mood/Affect: anxious and congruent  Self report: None Reported  Response to learning: Able to verbalize current knowledge/experience, Able to verbalize/acknowledge new learning, Able to retain information, Capable of insight, Able to change behavior, and Progressing to goal  Discipline Responsible: /Counselor    [x] Check in completed and reviewed.    Intervention required. No  Electronically signed by Shelley [facility]heta [facility] on 12/12/2024 at 1:47 PM

## 2024-12-12 NOTE — GROUP NOTE
Start Time: 900 AM   End Time : 1030 AM  Number of participants:3  Type of group: Psychotherapy  Mode of intervention: Education, Support, Socialization, Exploration, Clarifying, Problem-solving, Media, Confrontation, Limit-setting, and Reality-testing  Topic: Disease Concept of Addiction  Objective:  Explore and help client gain awareness how substance use dependence is a disease.      Note: Client actively participated in the group session. Counselor processed and explored risky overdose behavior. The client presented to today's session and counselor explored behaviors related to risky substance use, particularly engaging in behaviors that may increase the risk of overdose. The client admitted using substances in situations where they were alone, without a clear intention of seeking help if something went wrong.Additionally, the client demonstrated an awareness of their risky behaviors but struggles with motivation to act on that knowledge as it relates to continuing to associate with people who use marijuana. The counselor will need to continue exploring the reasons behind this ambivalence to aid client recovery process.          Status after intervention:Unchanged  Participation level: Active Listener and Interactive  Participation Quality: Appropriate, Attentive, and Sharing  Speech: normal  Thought Process/Content:Logical  Mood/Affect: congruent  Self report: None Reported  Response to learning: Able to verbalize current knowledge/experience, Able to verbalize/acknowledge new learning, Able to retain information, Capable of insight, and Resistant  Discipline Responsible: /Counselor    [x] Check in completed and reviewed.    Intervention required. No  Electronically signed by NADER Brock on 12/12/2024 at 1:09 PM

## 2024-12-16 ENCOUNTER — HOSPITAL ENCOUNTER (OUTPATIENT)
Dept: PSYCHIATRY | Age: 39
Setting detail: THERAPIES SERIES
Discharge: HOME | End: 2024-12-16
Payer: MEDICAID

## 2024-12-16 NOTE — FLOWSHEET NOTE
Client called off stating he needed to take his mother to an appointment.  Electronically signed by NADER Brock on 12/16/2024 at 2:30 PM

## 2024-12-17 ENCOUNTER — HOSPITAL ENCOUNTER (OUTPATIENT)
Dept: PSYCHIATRY | Age: 39
Setting detail: THERAPIES SERIES
Discharge: HOME | End: 2024-12-17
Payer: MEDICAID

## 2024-12-17 DIAGNOSIS — F10.20 ALCOHOL USE DISORDER, MODERATE, DEPENDENCE (HCC): ICD-10-CM

## 2024-12-17 PROCEDURE — 90832 PSYTX W PT 30 MINUTES: CPT

## 2024-12-17 PROCEDURE — 80305 DRUG TEST PRSMV DIR OPT OBS: CPT

## 2024-12-17 PROCEDURE — 99214 OFFICE O/P EST MOD 30 MIN: CPT | Performed by: PSYCHIATRY & NEUROLOGY

## 2024-12-17 RX ORDER — ESCITALOPRAM OXALATE 5 MG/1
5 TABLET ORAL NIGHTLY
Qty: 30 TABLET | Refills: 0 | Status: SHIPPED | OUTPATIENT
Start: 2024-12-17

## 2024-12-17 RX ORDER — OXCARBAZEPINE 300 MG/1
300 TABLET, FILM COATED ORAL 2 TIMES DAILY
Qty: 60 TABLET | Refills: 2 | Status: SHIPPED | OUTPATIENT
Start: 2024-12-17

## 2024-12-17 NOTE — PROGRESS NOTES
Individual Therapy note    Date: 12/17/2024  Start time:900 AM   End time: 930 AM     Patient's goal: \" I want to stay sober\"    S: Subjective The client actively participated in the session and reported that he attended one meeting prior to this session. He admitted to the counselor that he relapsed on Sunday, December 8, 2024, after using marijuana oil provided by a peer he associates with. The client expressed some regret and frustration about the relapse, acknowledging it hindered his progress towards sobriety.  O: Objective The client appeared engaged during the session and showed openness in discussing his relapse. His mood appeared slightly downcast when recounting the event, but he was responsive to feedback. There were no signs of acute distress observed, though he did seem reflective and attentive to the discussion.  A: Assessment The client’s relapse appears to be tied to peer influence, as he used marijuana oil from a peer. This incident demonstrates challenges related to managing triggers, specifically peer associations. The counselor addressed the impact of this relapse on his sobriety goals and emphasized how continued illicit substance use negatively affects both his recovery process and mental health. The client acknowledged this impact and seemed to understand the seriousness of the situation.  P: Plan  Continue to explore relapse triggers, particularly peer-related factors, and develop coping strategies.  Encourage the client to attend additional meetings and engage in support networks to bolster recovery.  Emphasize the importance of staying away from people and situations that may pose a risk to sobriety.  Schedule client to go to [facility] to address past trauma and mental health issue as it relates to anxiety.  Client given homework assignment to come up with (1) goal to set boundaries with using peers.      Status After Intervention:  Improved    Participation Level: Active

## 2024-12-17 NOTE — PROGRESS NOTES
PSYCHIATRY ATTENDING NOTE    CC: \"The last couple days have been uneasy. I don't know what it is.\"    S: Patient being seen at [facility] in follow-up for alcohol use disorder, ROYCE and mood disorder. No changes made last appointment. Met with patient to discuss progress with treatment.     Shashank presents somewhat anxious and restless today  Reports he has been feeling on edge the last couple of days  Has been irritable and reports everyone \"getting under my skin\"  Still endorses racing thoughts, worry, poor concentration, etc  Patient now agreeable to trying maintenance anxiolytic  Discussed risks/benefits/alternatives of Lexapro  Sober from alcohol for 40 days at this point  Denies alcohol cravings  Did have an isolated marijuana use on 12/8    MSE: Male appears age. Pleasant, cooperative, forthcoming. Restless psychomotor activity. Normal gait, strength, tone, eye contact. Mood anxious. Affect flexible. Speech clear. Thought process organized. Content future-oriented. No SI or HI. No paranoia, delusions, hallucinations. Orientation, concentration, recent and remote memory are grossly intact. Fund of knowledge fair. Language use fair. Insight and judgment fair.     MEDICATIONS:   Trileptal 300 mg bid (cont)     Lexapro 5 mg daily (new)    ASSESSMENT:  Alcohol Use Disorder  Mood Disorder  ROYCE  Rule-outs: cyclothymia, bipolar II, PTSD    PLAN: Continue IOP. Start Lexapro for generalized anxiety and plan to titrate to 10 mg in two weeks if patient is tolerating. Support and reassurance provided. Continue to monitor symptoms, side effects and response to medications. Adjust treatment as needed. See back two weeks. Discuss with team.                           Electronically signed by Lico Teague MD on 12/17/2024 at 9:46 AM

## 2024-12-18 NOTE — PLAN OF CARE
Client discussed in treatment team today.    Present:  Dr. Ho CHRISTIANSON , NADER Brock    Current Status: IOP    Treatment goals:    [x] Relapse prevention  [x] Increase sober support  [x] Increase attendance in sober support groups  [x] Enders effectively with cravings and urges  [x] Other: Mental Health     Recommendation: Client making fair progress as evidenced by starting to open up in group about triggers and he is starting to attend sober support group meetings. Client recommended to be referred to [facility] for mental health counseling.    Electronically signed by NADER Brock on 12/18/2024 at 2:27 PM       
24-hour structured setting    [] If there is high severity in this dimension, but not in any other dimension, motivational enhancement strategies should be provided in Level 1 [] Problems in this dimension do not qualify the client for Level 4 services    [] If the client's only severity is in Dimension 4,5, and/or 6 without high severity in Dimensions 1,2, and/or 3, then client is not qualified for Level 4   COMMENTS:           Dimension 5  Relapse, Continued Use or Continued Problem Potential  [] Able to maintain abstinence or control use and/or addictive behaviors  and pursue recovery or motivational goals with minimal support [x] Intensification of addiction or mental health symptoms indicate high likelihood of relapse risk or continued use or continued problems without  close monitoring and support several times/wk. [] Intensification of addiction or mental health symptoms, despite active participation in a Level 1 or 2.1 program, indicates high likelihood of relapse or continued use or continued problems without near-daily monitoring [] Understands relapse but needs structure to maintain therapeutic gains  [] Has little awareness and needs interventions available only at Level 3.3 to prevent continued use, with imminent dangerous consequences, because of cognitive deficits or  comparable dysfunction  [] Has no recognition  of the skills needed to prevent continued use,  with imminently dangerous  consequences [] Unable to control use, with imminently dangerous consequences,  despite active participation at less intensive levels of care [] Problems in this dimension do not qualify the client for Level 4 services    [] If the client's only severity is in Dimension 4,5, and/or 6 without high severity in Dimensions 1,2, and/or 3, then client is not qualified for Level 4   COMMENTS:           Dimension 6  Recovery/Living Environment []  Recovery environment is supportive and/or the client has skills to cope [x]

## 2024-12-19 ENCOUNTER — HOSPITAL ENCOUNTER (OUTPATIENT)
Dept: PSYCHIATRY | Age: 39
Setting detail: THERAPIES SERIES
Discharge: HOME | End: 2024-12-19
Payer: MEDICAID

## 2024-12-19 PROCEDURE — H0015 ALCOHOL AND/OR DRUG SERVICES: HCPCS

## 2024-12-19 NOTE — GROUP NOTE
Start Time: 1045  AM   End Time : 12:00 PM  Number of participants:3  Type of group: Psychoeducation  Mode of intervention: Education, Support, Socialization, Exploration, Clarifying, Problem-solving, Media, Confrontation, Limit-setting, and Reality-testing  Topic: Disease Concept of Addiction  Objective:  Explore and help client gain awareness how substance use dependence is a disease.      Note: Client actively participated in the group session. Counselor processed and explored disease concept education by Humaira and assigned client to view education \"Symptoms and phases of addiction\" by Humaira. Client was able to relate to the education primary , chronic , progression, genetics, and fatal aspects. Client verbalized 2-4 aspects of the disease concept as personal experiences.         Status after intervention:Improved  Participation level: Active Listener and Interactive  Participation Quality: Appropriate, Attentive, Sharing, and Intrusive  Speech: normal  Thought Process/Content:Flight of ideas  Mood/Affect: anxious  Self report: None Reported  Response to learning: Able to verbalize current knowledge/experience, Able to verbalize/acknowledge new learning, Able to retain information, and Capable of insight  Discipline Responsible: /Counselor    [x] Check in completed and reviewed.    Intervention required. No  Electronically signed by NADER Brock on 12/19/2024 at 1:25 PM

## 2024-12-19 NOTE — GROUP NOTE
D - Data (Observations and Facts)  Data:  Client actively participated in the group session, which focused on the impact of substance use on self-esteem. Despite his engagement, he exhibited signs of anxiety throughout the session, including constant movement, talking out of turn, and distracting others by fidgeting with objects that made noise, such as clicking his ink pen. The counselor had to consistently redirect the client to focus on the group discussion and remind him to refrain from making jokes and noises that disturbed other group members.  Several clients shared how substance use had affected their self-worth, relationships, and perceptions of themselves, leading to feelings of guilt, shame, and inadequacy. The client disclosed that he relapsed and used marijuana last Sunday after being in the company of individuals who were under the influence and using marijuana. He acknowledged that this relapse made him feel bad about himself and expressed a commitment to stop using marijuana going forward.  A - Assessment (Analysis and Interpretation)  Client's anxiety continues to be a barrier to fully engaging in group therapy, as evidenced by his distractive behaviors. This anxiety may stem from underlying issues of guilt and shame related to his substance use. The disclosure of his recent relapse indicates that the client may still struggle with peer influence and environmental triggers. However, his acknowledgment of the relapse and his commitment to stopping marijuana use suggest a level of self-awareness and a desire for change.  P - Plan (Next Steps and Goals)  Continue to provide redirection and support for the client to manage his anxiety during group sessions.  Encourage the client to explore the underlying factors contributing to his anxiety and substance use.  Work with the client on developing coping strategies to manage triggers and prevent future relapses.  Monitor the client's progress with his

## 2024-12-23 ENCOUNTER — HOSPITAL ENCOUNTER (OUTPATIENT)
Dept: PSYCHIATRY | Age: 39
Setting detail: THERAPIES SERIES
Discharge: HOME | End: 2024-12-23
Payer: MEDICAID

## 2024-12-23 PROCEDURE — 80305 DRUG TEST PRSMV DIR OPT OBS: CPT

## 2024-12-23 PROCEDURE — H0015 ALCOHOL AND/OR DRUG SERVICES: HCPCS

## 2024-12-24 ENCOUNTER — APPOINTMENT (OUTPATIENT)
Dept: PSYCHIATRY | Age: 39
End: 2024-12-24
Payer: MEDICAID

## 2024-12-25 DIAGNOSIS — E78.2 MIXED HYPERLIPIDEMIA: ICD-10-CM

## 2024-12-26 ENCOUNTER — HOSPITAL ENCOUNTER (OUTPATIENT)
Dept: PSYCHIATRY | Age: 39
Setting detail: THERAPIES SERIES
Discharge: HOME | End: 2024-12-26
Payer: MEDICAID

## 2024-12-26 PROCEDURE — 90832 PSYTX W PT 30 MINUTES: CPT

## 2024-12-26 NOTE — PROGRESS NOTES
Date: 12/26/2024  Start Time: 9:00 AM  End Time: 9:45 AM  Patient's Goal: “Sobriety    Client actively participated in the individual session. He did not provide any documentation of attending meetings since his last session.  He was able to check in and share that he has been feeling sick for the last few days with a cold and did not get to any meetings. He discussed his last use of THC which was on Dec 7th when he used THC drops at a friend's. He stated he did not know why he did except to say that they were available and there. He did share that he is staying away from that friend's house for now. Today's session focused on a reading from AA on “At the [place]” -being on the right Road and a lesson on which road are we are? Client was able to share that he sees he is rebellious and spends too much time alone. He was able to identify and list 1-2 behavioral changes to address these issues by engaging in the program and being around sober people. He did share that he is beginning to look at himself and does have hope. He was attentive to the session and able to receive feedback and receptive to the encouragement given. He was encouraged to continue to attend IOP, utilize the tools he has and to put his priorities first and increase his sober supports.       Status after intervention:Unchanged  Participation level: Active Listener and Interactive  Participation Quality: Appropriate, Attentive, and Sharing  Speech: normal  Thought Process/Content:Logical  Mood/Affect: anxious and depressed  Self report: None Reported  Response to learning: Able to verbalize current knowledge/experience, Able to verbalize/acknowledge new learning, and Able to retain information  Discipline Responsible: /Counselor    [x] Check in completed and reviewed.    Intervention required. No  Electronically signed by JUDAH Lindquist on 12/26/2024 at 11:29 AM

## 2024-12-26 NOTE — TELEPHONE ENCOUNTER
Last Appointment:  11/19/2024  Future Appointments   Date Time Provider Department Center   12/30/2024  9:00 AM SCHEDULE, [facility] [place]   12/31/2024  9:00 AM SCHEDULE, [facility] [place]   12/31/2024 11:00 AM Lico Teague MD [facility] [place]   1/2/2025  9:00 AM SCHEDULE, [facility] [place]   1/6/2025  9:00 AM SCHEDULE, [facility] [place]   1/7/2025  9:00 AM SCHEDULE, [facility] [place]   1/9/2025  9:00 AM SCHEDULE, [facility] [place]   1/13/2025  9:00 AM SCHEDULE, [facility] [place]   1/14/2025  9:00 AM SCHEDULE, [facility] [place]   1/15/2025  9:00 AM Edgardo Tavares MD [facility]   1/16/2025  9:00 AM SCHEDULE, [facility] [place]   1/20/2025  9:00 AM SCHEDULE, [facility] [place]

## 2024-12-27 RX ORDER — ROSUVASTATIN CALCIUM 10 MG/1
10 TABLET, COATED ORAL DAILY
Qty: 90 TABLET | Refills: 1 | Status: SHIPPED | OUTPATIENT
Start: 2024-12-27

## 2024-12-30 ENCOUNTER — HOSPITAL ENCOUNTER (OUTPATIENT)
Dept: PSYCHIATRY | Age: 39
Setting detail: THERAPIES SERIES
Discharge: HOME | End: 2024-12-30
Payer: MEDICAID

## 2024-12-30 DIAGNOSIS — F10.20 ALCOHOL DEPENDENCE, UNCOMPLICATED (HCC): Primary | ICD-10-CM

## 2024-12-30 PROCEDURE — H0005 ALCOHOL AND/OR DRUG SERVICES: HCPCS

## 2024-12-30 PROCEDURE — 80305 DRUG TEST PRSMV DIR OPT OBS: CPT

## 2024-12-30 PROCEDURE — H0015 ALCOHOL AND/OR DRUG SERVICES: HCPCS

## 2024-12-30 NOTE — GROUP NOTE
Only attended one group  12/30/2024  Start Time: 9:00 AM  End Time: 10:00 AM  Number of participants:3  Type of group: Relapse Prevention  Mode of intervention: Education, Support, Socialization, Exploration, Clarifying, Problem-solving, Confrontation, Limit-setting, and Reality-testing  Topic: People Pleasing vs. Healthy Relationships  Objective:  Assist the client with an increased insight and awareness on what makes a healthy relationship and the discussion of understanding the differences between people-pleasing and health relationships and how that can affects one's recovery.     Notes: Client actively participated in the session and did not provide any verification of attending sober support group meetings since last session. He stated he has been sick and that his car had been down. He was reminded of the importance of meetings and was encouraged to get to them this week.  Client able to check in and share his holiday week and stated that he stayed sober and had an enjoyable one with his family despite his ex-mother-in-law passing away. He did share what coping skills he did and did not use this week which were pushing himself as he did not feel good but he was isolated . Client share that last week he did take his peers to a meeting and did not this week.   Client provided with a packet of information on handouts dealing with People Pleasing: Assessment; understanding, masks, signs, and ways to address   them. Pt stated he would take the packet home and read it as he had to leave a little early. Client was able to make positive connections with others through discussing personal issues with others in the group while providing his own thoughts and comments about the topic. He was provided with encouragement and support from his peers.      Status after intervention:Unchanged  Participation level: Active Listener  Participation Quality: Appropriate and Attentive  Speech: normal  Thought

## 2024-12-31 ENCOUNTER — HOSPITAL ENCOUNTER (OUTPATIENT)
Dept: PSYCHIATRY | Age: 39
Setting detail: THERAPIES SERIES
Discharge: HOME | End: 2024-12-31
Payer: MEDICAID

## 2024-12-31 PROCEDURE — 99214 OFFICE O/P EST MOD 30 MIN: CPT | Performed by: PSYCHIATRY & NEUROLOGY

## 2024-12-31 PROCEDURE — 90832 PSYTX W PT 30 MINUTES: CPT

## 2024-12-31 RX ORDER — ESCITALOPRAM OXALATE 10 MG/1
10 TABLET ORAL NIGHTLY
Qty: 30 TABLET | Refills: 2 | Status: SHIPPED | OUTPATIENT
Start: 2024-12-31

## 2024-12-31 NOTE — PROGRESS NOTES
Individual Progress Note    12/31/2024  Start Time: 9:00 am  End Time: 9:45 am  Goal: To stay sober  Topic: Relapse Prevention  Objective:  Assist the client with an increased insight and awareness on relapse prevention though an assignment on Step One and Making SMART Goals for the New Year      Notes: Client actively participated in the session and did not provide any verification of attending sober support group meetings since last session. Client able to share his thoughts on the devotion shared from AA by stating he could relate to having delays as he feels life is always a delay for him. Client provided with a follow-up assignment from yesterday to identify one task he will work on over the next week and he stated to set SMART goals and he stated to take his Sarita decorations down; go to a meeting Saturday at the [employer] and engage with his children in one activity.  He also was presented with (2) handouts on making SMART goals for the New Year.  Client was able to verbalize and process 2-3 areas he is now aware of that he needs to work on which are negativity and dead weight and to apply positive coping skills such as affirmations and thought stopping in these areas and to set boundaries. Client was receptive to the feedback given and it was stressed that he increase his sober supports.      Status after intervention:Improved  Participation level: Active Listener  Participation Quality: Appropriate, Attentive, and Sharing  Speech: normal  Thought Process/Content:Logical  Mood/Affect: anxious and depressed  Self report: None Reported  Response to learning: Able to verbalize current knowledge/experience, Able to verbalize/acknowledge new learning, and Able to retain information  Discipline Responsible: /Counselor    [x] Check in completed and reviewed.    Intervention required. No  Electronically signed by JUDAH Lindquist on 12/31/2024 at 10:59 AM

## 2024-12-31 NOTE — PROGRESS NOTES
PSYCHIATRY ATTENDING NOTE    CC: \"I'm OK.\"    S: Patient being seen at [facility] in follow-up for alcohol use disorder, ROYCE and mood disorder. Lexapro started last appointment. Met with patient to discuss progress with treatment.     Shashank presents in fair spirits  Tolerating Lexapro without incident  Mood largely unchanged but seems less anxious  Mutually agreed to increase Lexapro to 10 mg  Children's grandmother (patient's ex mother-in-law) passed away suddenly from MI two days ago  Sober from alcohol for 54 days at this point  Denies alcohol cravings    MSE: Male appears age. Pleasant, cooperative, forthcoming. Restless psychomotor activity. Normal gait, strength, tone, eye contact. Mood anxious. Affect flexible. Speech clear. Thought process organized. Content future-oriented. No SI or HI. No paranoia, delusions, hallucinations. Orientation, concentration, recent and remote memory are grossly intact. Fund of knowledge fair. Language use fair. Insight and judgment fair.     MEDICATIONS:   Trileptal 300 mg bid (cont)     Lexapro 10 mg daily (increasing)    ASSESSMENT:  Alcohol Use Disorder  Mood Disorder  ROYCE  Rule-outs: cyclothymia, bipolar II, PTSD    PLAN: Continue IOP. Optimize Lexapro. Support and reassurance provided. Continue to monitor symptoms, side effects and response to medications. Adjust treatment as needed. See back two weeks. Discuss with team.                           Electronically signed by Lico Teague MD on 12/31/2024 at 9:15 AM

## 2025-01-02 ENCOUNTER — HOSPITAL ENCOUNTER (OUTPATIENT)
Dept: PSYCHIATRY | Age: 40
Setting detail: THERAPIES SERIES
Discharge: HOME | End: 2025-01-02
Payer: MEDICAID

## 2025-01-02 PROCEDURE — 90837 PSYTX W PT 60 MINUTES: CPT

## 2025-01-02 NOTE — PROGRESS NOTES
Individual Therapy note    Date:1/2/25   Start time:900 AM  End time: 945 AM    Patient's goal: \" I want to stay sober\"    S: Subjective Client attended today's individual Intensive Outpatient Program (IOP) session. The focus of the session was on developing refusal skills. Client reported that he was able to relate to the educational material provided, specifically the video \"Refusal Skills\" by Prem. He shared that he learned the importance of using logic over emotions when feeling triggered. The client also verbalized that he recognizes the need to re-engage in AA/NA groups to build sober relationships and strengthen his support network. He expressed an openness to this approach as a tool for his continued recovery.  O: Objective The client was present and engaged in the session. He was able to identify venegas points from the educational material and demonstrated insight into how he can apply the concept of using logic over emotions in challenging situations. Additionally, the client took the initiative to express his intention to attend AA/NA meetings as part of his recovery process. Counselor facilitated the discussion around developing refusal skills and guided the client in recognizing the importance of support from sober individuals.  A: Assessment The client appears to be making progress in understanding and applying refusal skills. He demonstrated good self-awareness by identifying his emotional triggers and recognizing the importance of logic in responding to those triggers. His acknowledgment of the need to re-engage in AA/NA groups shows insight into the role of supportive relationships in his recovery. The client is actively participating in his recovery journey, and there is evidence of a commitment to long-term sobriety.  P: Plan  Continue to work on developing refusal skills in future sessions.  Monitor the client's progress in implementing logic over emotions when triggered.  Follow up on the

## 2025-01-06 ENCOUNTER — HOSPITAL ENCOUNTER (OUTPATIENT)
Dept: PSYCHIATRY | Age: 40
Setting detail: THERAPIES SERIES
Discharge: HOME | End: 2025-01-06
Payer: MEDICAID

## 2025-01-06 NOTE — FLOWSHEET NOTE
Client reported off stating he had a back injury and he was going to the doctor today and will be in IOP tomorrow.  Electronically signed by NADER Brock on 1/6/2025 at 11:15 AM

## 2025-01-07 ENCOUNTER — OFFICE VISIT (OUTPATIENT)
Age: 40
End: 2025-01-07
Payer: MEDICAID

## 2025-01-07 DIAGNOSIS — F10.20 ALCOHOL USE DISORDER, MODERATE, DEPENDENCE (HCC): ICD-10-CM

## 2025-01-07 DIAGNOSIS — F41.1 GAD (GENERALIZED ANXIETY DISORDER): Primary | ICD-10-CM

## 2025-01-07 PROCEDURE — 4004F PT TOBACCO SCREEN RCVD TLK: CPT | Performed by: SOCIAL WORKER

## 2025-01-07 PROCEDURE — 90791 PSYCH DIAGNOSTIC EVALUATION: CPT | Performed by: SOCIAL WORKER

## 2025-01-07 NOTE — PROGRESS NOTES
ADULT BEHAVIORAL HEALTH ASSESSMENT  Corwin Rebolledo MSW, LISW-S  Visit Date: 1/7/2025   Time of appointment:  1:59   Time spent with Patient: 60 minutes.   This is patient's first appointment.    Reason for Consult:  Shashank was seen today for anxiety and drug / alcohol assessment.    Diagnoses and all orders for this visit:    ROYCE (generalized anxiety disorder)    Alcohol use disorder, moderate, dependence (HCC)      Referring Provider/PCP:    Edgardo Tavares MD      Pt provided informed consent for the behavioral health program. Discussed with patient model of service to include the limits of confidentiality (i.e. abuse reporting, suicide intervention, etc.) and short-term intervention focused approach.       PRESENTING PROBLEM AND HISTORY  Shashank is a 39 y.o. male who presents for new evaluation and treatment of  anxiety, excessive alcohol consumption, illegal drug usage.  He has the following symptoms: isolating self, excessive worry about a number of events or activities , inability to stop or control worry, history of trauma, and substance abuse/use .  Onset of symptoms was approximately several years ago.  Symptoms have been gradually worsening for anxiety & some substance abuse since that time.  He denies current suicidal and homicidal ideation.  Family history significant for anxiety and depression.  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  Previous treatment includes Lexapro.  He complains of the following medication side effects: none.    MENTAL STATUS EXAM  Mood was somewhat constricted, decreased range, depressed, and dysthymic with depressed and calm affect.   Suicidal ideation was denied.   Homicidal ideation was denied.   Hygiene was fair .  Dress was appropriate.   Behavior was Within Normal Limits with No observation or self-report of difficulties ambulating.   Attitude was Cooperative, Engageable, and somewhat Guarded.  Eye-contact was

## 2025-01-13 ENCOUNTER — APPOINTMENT (OUTPATIENT)
Dept: PSYCHIATRY | Age: 40
End: 2025-01-13
Payer: MEDICAID

## 2025-01-14 ENCOUNTER — APPOINTMENT (OUTPATIENT)
Dept: PSYCHIATRY | Age: 40
End: 2025-01-14
Payer: MEDICAID

## 2025-01-16 ENCOUNTER — APPOINTMENT (OUTPATIENT)
Dept: PSYCHIATRY | Age: 40
End: 2025-01-16
Payer: MEDICAID

## 2025-01-20 ENCOUNTER — APPOINTMENT (OUTPATIENT)
Dept: PSYCHIATRY | Age: 40
End: 2025-01-20
Payer: MEDICAID

## 2025-04-18 RX ORDER — ESCITALOPRAM OXALATE 10 MG/1
10 TABLET ORAL NIGHTLY
Qty: 30 TABLET | Refills: 5 | Status: SHIPPED | OUTPATIENT
Start: 2025-04-18

## 2025-04-21 RX ORDER — ESCITALOPRAM OXALATE 10 MG/1
10 TABLET ORAL NIGHTLY
Qty: 30 TABLET | Refills: 0 | OUTPATIENT
Start: 2025-04-21

## 2025-04-24 DIAGNOSIS — F10.20 ALCOHOL USE DISORDER, MODERATE, DEPENDENCE (HCC): ICD-10-CM

## 2025-04-24 NOTE — TELEPHONE ENCOUNTER
Name of Medication(s) Requested:  Requested Prescriptions     Pending Prescriptions Disp Refills    escitalopram (LEXAPRO) 10 MG tablet 30 tablet 5     Sig: Take 1 tablet by mouth at bedtime    gabapentin (NEURONTIN) 300 MG capsule 10 capsule 0     Sig: Take 1 caps every 6 hours x1 day, then 1 caps every 8 hours x1 day, then 1 caps BID x1 day, then 1 caps at night       Medication is on current medication list Yes    Dosage and directions were verified? Yes    Quantity verified: 30 day supply     Pharmacy Verified?  Yes    Last Appointment:  11/19/2024    Future appts:  No future appointments.     (If no appt send self scheduling link. .REFILLAPPT)  Scheduling request sent?     [] Yes  [] No    Does patient need updated?  [] Yes  [] No

## 2025-04-29 RX ORDER — GABAPENTIN 300 MG/1
CAPSULE ORAL
Qty: 10 CAPSULE | Refills: 0 | OUTPATIENT
Start: 2025-04-29

## 2025-04-29 RX ORDER — ESCITALOPRAM OXALATE 10 MG/1
10 TABLET ORAL NIGHTLY
Qty: 30 TABLET | Refills: 5 | Status: SHIPPED | OUTPATIENT
Start: 2025-04-29

## 2025-05-27 ENCOUNTER — TELEPHONE (OUTPATIENT)
Dept: PRIMARY CARE CLINIC | Age: 40
End: 2025-05-27

## 2025-06-09 DIAGNOSIS — I10 PRIMARY HYPERTENSION: ICD-10-CM

## 2025-06-10 RX ORDER — LISINOPRIL 20 MG/1
20 TABLET ORAL DAILY
Qty: 30 TABLET | Refills: 0 | Status: SHIPPED | OUTPATIENT
Start: 2025-06-10

## 2025-06-18 ENCOUNTER — OFFICE VISIT (OUTPATIENT)
Dept: PRIMARY CARE CLINIC | Age: 40
End: 2025-06-18
Payer: MEDICAID

## 2025-06-18 DIAGNOSIS — R73.03 PREDIABETES: Primary | ICD-10-CM

## 2025-06-18 DIAGNOSIS — E78.2 MIXED HYPERLIPIDEMIA: ICD-10-CM

## 2025-06-18 DIAGNOSIS — F41.1 GAD (GENERALIZED ANXIETY DISORDER): ICD-10-CM

## 2025-06-18 DIAGNOSIS — I10 PRIMARY HYPERTENSION: ICD-10-CM

## 2025-06-18 DIAGNOSIS — F39 MOOD DISORDER: ICD-10-CM

## 2025-06-18 PROCEDURE — G8427 DOCREV CUR MEDS BY ELIG CLIN: HCPCS | Performed by: FAMILY MEDICINE

## 2025-06-18 PROCEDURE — 3079F DIAST BP 80-89 MM HG: CPT | Performed by: FAMILY MEDICINE

## 2025-06-18 PROCEDURE — G8419 CALC BMI OUT NRM PARAM NOF/U: HCPCS | Performed by: FAMILY MEDICINE

## 2025-06-18 PROCEDURE — 3075F SYST BP GE 130 - 139MM HG: CPT | Performed by: FAMILY MEDICINE

## 2025-06-18 PROCEDURE — 99214 OFFICE O/P EST MOD 30 MIN: CPT | Performed by: FAMILY MEDICINE

## 2025-06-18 PROCEDURE — 4004F PT TOBACCO SCREEN RCVD TLK: CPT | Performed by: FAMILY MEDICINE

## 2025-06-18 RX ORDER — ROSUVASTATIN CALCIUM 10 MG/1
10 TABLET, COATED ORAL DAILY
Qty: 90 TABLET | Refills: 1 | Status: SHIPPED | OUTPATIENT
Start: 2025-06-18

## 2025-06-18 RX ORDER — LISINOPRIL 20 MG/1
20 TABLET ORAL DAILY
Qty: 90 TABLET | Refills: 1 | Status: SHIPPED | OUTPATIENT
Start: 2025-06-18

## 2025-06-18 RX ORDER — OXCARBAZEPINE 150 MG/1
150 TABLET, FILM COATED ORAL 2 TIMES DAILY
Qty: 60 TABLET | Refills: 0 | Status: SHIPPED | OUTPATIENT
Start: 2025-06-18

## 2025-06-18 SDOH — ECONOMIC STABILITY: FOOD INSECURITY: WITHIN THE PAST 12 MONTHS, YOU WORRIED THAT YOUR FOOD WOULD RUN OUT BEFORE YOU GOT MONEY TO BUY MORE.: NEVER TRUE

## 2025-06-18 SDOH — ECONOMIC STABILITY: FOOD INSECURITY: WITHIN THE PAST 12 MONTHS, THE FOOD YOU BOUGHT JUST DIDN'T LAST AND YOU DIDN'T HAVE MONEY TO GET MORE.: NEVER TRUE

## 2025-06-18 ASSESSMENT — PATIENT HEALTH QUESTIONNAIRE - PHQ9
1. LITTLE INTEREST OR PLEASURE IN DOING THINGS: MORE THAN HALF THE DAYS
6. FEELING BAD ABOUT YOURSELF - OR THAT YOU ARE A FAILURE OR HAVE LET YOURSELF OR YOUR FAMILY DOWN: SEVERAL DAYS
10. IF YOU CHECKED OFF ANY PROBLEMS, HOW DIFFICULT HAVE THESE PROBLEMS MADE IT FOR YOU TO DO YOUR WORK, TAKE CARE OF THINGS AT HOME, OR GET ALONG WITH OTHER PEOPLE: EXTREMELY DIFFICULT
9. THOUGHTS THAT YOU WOULD BE BETTER OFF DEAD, OR OF HURTING YOURSELF: NOT AT ALL
SUM OF ALL RESPONSES TO PHQ QUESTIONS 1-9: 12
3. TROUBLE FALLING OR STAYING ASLEEP: MORE THAN HALF THE DAYS
SUM OF ALL RESPONSES TO PHQ QUESTIONS 1-9: 12
7. TROUBLE CONCENTRATING ON THINGS, SUCH AS READING THE NEWSPAPER OR WATCHING TELEVISION: MORE THAN HALF THE DAYS
5. POOR APPETITE OR OVEREATING: SEVERAL DAYS
4. FEELING TIRED OR HAVING LITTLE ENERGY: MORE THAN HALF THE DAYS
8. MOVING OR SPEAKING SO SLOWLY THAT OTHER PEOPLE COULD HAVE NOTICED. OR THE OPPOSITE, BEING SO FIGETY OR RESTLESS THAT YOU HAVE BEEN MOVING AROUND A LOT MORE THAN USUAL: SEVERAL DAYS
2. FEELING DOWN, DEPRESSED OR HOPELESS: SEVERAL DAYS

## 2025-06-18 NOTE — PROGRESS NOTES
Shashank Rosales (:  1985) is a 40 y.o. male, Established patient, here for evaluation of the following chief complaint(s):  Medication Adjustment    Assessment & Plan  Prediabetes   Chronic, at goal (stable), continue current treatment plan and lifestyle modifications recommended         Mixed hyperlipidemia   Chronic, at goal (stable), continue current treatment plan    Orders:    rosuvastatin (CRESTOR) 10 MG tablet; Take 1 tablet by mouth daily    Primary hypertension   Chronic, at goal (stable), continue current treatment plan    Orders:    lisinopril (PRINIVIL;ZESTRIL) 20 MG tablet; Take 1 tablet by mouth daily    Mood disorder   Following with new counselor. Will see new Psychiatrist in next week or 2. Counselor recs coming off of Trileptal so they can better diagnose and choose different medication for mood disorder, probable bipolar I. He had a recent manic episode which he has come out of. Would like instructions to wean off of Trileptal. Instructions provided, advised if he's feeling more labile during taper to resume Trileptal and wait until he gets in with Psychiatrist. Call with concerns.          ROYCE (generalized anxiety disorder)   Continue Lexapro           No follow-ups on file.    Subjective       HPI  Review of Systems   Constitutional:  Negative for activity change, appetite change, chills, diaphoresis, fatigue and fever.   Respiratory: Negative.     Cardiovascular: Negative.    Neurological: Negative.    Psychiatric/Behavioral:  Positive for dysphoric mood. Negative for agitation, behavioral problems, confusion, decreased concentration, hallucinations, self-injury, sleep disturbance and suicidal ideas. The patient is nervous/anxious and is hyperactive.        Past medical, surgical, family and social histories reviewed and updated today as appropriate            Objective     /84   Pulse 68   Temp 97.1 °F (36.2 °C) (Oral)   Resp 16   Ht 1.829 m (6')   Wt 85.4 kg (188 lb

## 2025-06-18 NOTE — ASSESSMENT & PLAN NOTE
Chronic, at goal (stable), continue current treatment plan    Orders:    lisinopril (PRINIVIL;ZESTRIL) 20 MG tablet; Take 1 tablet by mouth daily

## 2025-06-24 VITALS
WEIGHT: 188.2 LBS | OXYGEN SATURATION: 100 % | SYSTOLIC BLOOD PRESSURE: 135 MMHG | DIASTOLIC BLOOD PRESSURE: 84 MMHG | RESPIRATION RATE: 16 BRPM | HEIGHT: 72 IN | TEMPERATURE: 97.1 F | BODY MASS INDEX: 25.49 KG/M2 | HEART RATE: 68 BPM

## 2025-06-24 ASSESSMENT — ENCOUNTER SYMPTOMS: RESPIRATORY NEGATIVE: 1

## 2025-06-25 NOTE — ASSESSMENT & PLAN NOTE
Following with new counselor. Will see new Psychiatrist in next week or 2. Counselor recs coming off of Trileptal so they can better diagnose and choose different medication for mood disorder, probable bipolar I. He had a recent manic episode which he has come out of. Would like instructions to wean off of Trileptal. Instructions provided, advised if he's feeling more labile during taper to resume Trileptal and wait until he gets in with Psychiatrist. Call with concerns.